# Patient Record
Sex: MALE | Race: OTHER | NOT HISPANIC OR LATINO | Employment: UNEMPLOYED | URBAN - METROPOLITAN AREA
[De-identification: names, ages, dates, MRNs, and addresses within clinical notes are randomized per-mention and may not be internally consistent; named-entity substitution may affect disease eponyms.]

---

## 2018-10-30 ENCOUNTER — OFFICE VISIT (OUTPATIENT)
Dept: FAMILY MEDICINE CLINIC | Facility: CLINIC | Age: 63
End: 2018-10-30
Payer: COMMERCIAL

## 2018-10-30 VITALS
TEMPERATURE: 97.8 F | DIASTOLIC BLOOD PRESSURE: 60 MMHG | HEIGHT: 70 IN | OXYGEN SATURATION: 98 % | HEART RATE: 71 BPM | RESPIRATION RATE: 16 BRPM | BODY MASS INDEX: 26.54 KG/M2 | SYSTOLIC BLOOD PRESSURE: 110 MMHG | WEIGHT: 185.4 LBS

## 2018-10-30 DIAGNOSIS — L80 VITILIGO: Primary | Chronic | ICD-10-CM

## 2018-10-30 DIAGNOSIS — M50.30 DDD (DEGENERATIVE DISC DISEASE), CERVICAL: Chronic | ICD-10-CM

## 2018-10-30 DIAGNOSIS — M19.90 IDIOPATHIC OSTEOARTHRITIS: Chronic | ICD-10-CM

## 2018-10-30 DIAGNOSIS — L20.9 ATOPIC DERMATITIS, UNSPECIFIED TYPE: ICD-10-CM

## 2018-10-30 PROCEDURE — 3008F BODY MASS INDEX DOCD: CPT | Performed by: FAMILY MEDICINE

## 2018-10-30 PROCEDURE — 99214 OFFICE O/P EST MOD 30 MIN: CPT | Performed by: FAMILY MEDICINE

## 2018-10-30 RX ORDER — TRIAMCINOLONE ACETONIDE 1 MG/ML
LOTION TOPICAL 2 TIMES DAILY
Qty: 120 ML | Refills: 2 | Status: SHIPPED | OUTPATIENT
Start: 2018-10-30 | End: 2019-04-05

## 2018-11-02 PROBLEM — M19.90 IDIOPATHIC OSTEOARTHRITIS: Status: ACTIVE | Noted: 2017-01-03

## 2018-11-02 PROBLEM — M19.90 IDIOPATHIC OSTEOARTHRITIS: Chronic | Status: ACTIVE | Noted: 2017-01-03

## 2018-11-02 PROBLEM — L80 VITILIGO: Chronic | Status: ACTIVE | Noted: 2018-11-02

## 2018-11-02 NOTE — PROGRESS NOTES
Assessment/Plan:    1  [place] paperwork completed  2   Referral to Dermatology for vitiligo  3   Triamcinolone lotion for 10 days and then change to Cetaphil cream   4   Follow-up with new PCP in 3-6 months  Problem List Items Addressed This Visit     DDD (degenerative disc disease), cervical (Chronic)    Idiopathic osteoarthritis (Chronic)    Vitiligo - Primary (Chronic)    Relevant Medications    triamcinolone (KENALOG) 0 1 % lotion    Other Relevant Orders    Ambulatory referral to Dermatology      Other Visit Diagnoses     Atopic dermatitis, unspecified type        Relevant Medications    triamcinolone (KENALOG) 0 1 % lotion    Other Relevant Orders    Ambulatory referral to Dermatology            Subjective:      Patient ID: Trenton Carrillo is a 66 y o  male  51-year-old male with past medical history of degenerative disc disease of his cervical spine as well as his lumbar spine, generalized osteoarthritis, and vitiligo presents today for follow-up of his chronic conditions along with recent onset of dry skin that has been itchy on his lower extremities and upper extremities  He otherwise is feeling well  He states he does have intermittent exacerbations of his arthritis and spinal pain which caused him to be unable to perform his job functions and has to call out of work periodically for these chronic conditions  He would like to have his [facility] paperwork renewed today  The following portions of the patient's history were reviewed and updated as appropriate: allergies, current medications, past family history, past medical history, past social history, past surgical history and problem list     Review of Systems   Constitutional: Negative for appetite change and unexpected weight change  Eyes: Negative for visual disturbance  Respiratory: Negative for chest tightness and shortness of breath  Cardiovascular: Negative for chest pain, palpitations and leg swelling     Genitourinary: Negative for frequency  Musculoskeletal: Positive for arthralgias, back pain and neck pain  Skin: Negative for color change  Neurological: Negative for dizziness  Objective:      /60 (BP Location: Left arm, Patient Position: Sitting, Cuff Size: Large)   Pulse 71   Temp 97 8 °F (36 6 °C) (Tympanic)   Resp 16   Ht 5' 10" (1 778 m)   Wt 84 1 kg (185 lb 6 4 oz)   SpO2 98%   BMI 26 60 kg/m²          Physical Exam   Constitutional: He appears well-developed and well-nourished  No distress  HENT:   Head: Normocephalic and atraumatic  Neck: Normal range of motion  Neck supple  Carotid bruit is not present  No edema present  Cardiovascular: Normal rate, regular rhythm and normal heart sounds  Pulmonary/Chest: Effort normal and breath sounds normal  He has no wheezes  He has no rales  Neurological: He is alert  Skin: Skin is dry  Atopic dermatitis  Hypopigmented areas on his hands  Psychiatric: He has a normal mood and affect   His behavior is normal  Judgment and thought content normal

## 2019-03-01 ENCOUNTER — TRANSCRIBE ORDERS (OUTPATIENT)
Dept: ADMINISTRATIVE | Facility: HOSPITAL | Age: 64
End: 2019-03-01

## 2019-03-01 ENCOUNTER — APPOINTMENT (OUTPATIENT)
Dept: RADIOLOGY | Age: 64
End: 2019-03-01
Payer: COMMERCIAL

## 2019-03-01 DIAGNOSIS — F17.220 CHEWING TOBACCO NICOTINE DEPENDENCE, UNCOMPLICATED: ICD-10-CM

## 2019-03-01 DIAGNOSIS — K21.9 GASTROESOPHAGEAL REFLUX DISEASE, ESOPHAGITIS PRESENCE NOT SPECIFIED: ICD-10-CM

## 2019-03-01 DIAGNOSIS — R05.9 COUGH: Primary | ICD-10-CM

## 2019-03-01 DIAGNOSIS — R05.9 COUGH: ICD-10-CM

## 2019-03-01 PROCEDURE — 71046 X-RAY EXAM CHEST 2 VIEWS: CPT

## 2019-04-04 ENCOUNTER — HOSPITAL ENCOUNTER (EMERGENCY)
Facility: HOSPITAL | Age: 64
Discharge: HOME | End: 2019-04-05
Attending: EMERGENCY MEDICINE | Admitting: EMERGENCY MEDICINE
Payer: COMMERCIAL

## 2019-04-04 ENCOUNTER — OFFICE VISIT (OUTPATIENT)
Dept: URGENT CARE | Age: 64
End: 2019-04-04
Payer: COMMERCIAL

## 2019-04-04 VITALS
RESPIRATION RATE: 18 BRPM | SYSTOLIC BLOOD PRESSURE: 123 MMHG | HEIGHT: 69 IN | TEMPERATURE: 98.3 F | DIASTOLIC BLOOD PRESSURE: 91 MMHG | BODY MASS INDEX: 25.62 KG/M2 | OXYGEN SATURATION: 99 % | HEART RATE: 112 BPM | WEIGHT: 173 LBS

## 2019-04-04 DIAGNOSIS — R10.84 GENERALIZED ABDOMINAL PAIN: Primary | ICD-10-CM

## 2019-04-04 DIAGNOSIS — R11.2 NON-INTRACTABLE VOMITING WITH NAUSEA, UNSPECIFIED VOMITING TYPE: ICD-10-CM

## 2019-04-04 DIAGNOSIS — R19.7 DIARRHEA, UNSPECIFIED TYPE: ICD-10-CM

## 2019-04-04 LAB
SL AMB  POCT GLUCOSE, UA: NEGATIVE
SL AMB LEUKOCYTE ESTERASE,UA: ABNORMAL
SL AMB POCT BILIRUBIN,UA: NEGATIVE
SL AMB POCT BLOOD,UA: ABNORMAL
SL AMB POCT CLARITY,UA: CLEAR
SL AMB POCT COLOR,UA: YELLOW
SL AMB POCT KETONES,UA: 80
SL AMB POCT NITRITE,UA: NEGATIVE
SL AMB POCT PH,UA: 5
SL AMB POCT SPECIFIC GRAVITY,UA: 1.02
SL AMB POCT URINE PROTEIN: NEGATIVE
SL AMB POCT UROBILINOGEN: 0.2

## 2019-04-04 PROCEDURE — 87086 URINE CULTURE/COLONY COUNT: CPT | Performed by: PHYSICIAN ASSISTANT

## 2019-04-04 PROCEDURE — 99284 EMERGENCY DEPT VISIT MOD MDM: CPT

## 2019-04-04 PROCEDURE — 99284 EMERGENCY DEPT VISIT MOD MDM: CPT | Performed by: NURSE PRACTITIONER

## 2019-04-04 PROCEDURE — 99213 OFFICE O/P EST LOW 20 MIN: CPT | Performed by: PHYSICIAN ASSISTANT

## 2019-04-04 RX ORDER — ONDANSETRON 2 MG/ML
4 INJECTION INTRAMUSCULAR; INTRAVENOUS ONCE
Status: COMPLETED | OUTPATIENT
Start: 2019-04-05 | End: 2019-04-05

## 2019-04-04 RX ORDER — FEXOFENADINE HCL 180 MG/1
1 TABLET ORAL EVERY 24 HOURS
COMMUNITY
Start: 2017-09-18 | End: 2019-04-05

## 2019-04-04 RX ORDER — MONTELUKAST SODIUM 10 MG/1
TABLET ORAL
Refills: 0 | COMMUNITY
Start: 2019-03-11 | End: 2019-04-05

## 2019-04-04 RX ORDER — HYDROMORPHONE HCL/PF 1 MG/ML
1 SYRINGE (ML) INJECTION ONCE
Status: COMPLETED | OUTPATIENT
Start: 2019-04-05 | End: 2019-04-05

## 2019-04-04 RX ORDER — OMEPRAZOLE 20 MG/1
CAPSULE, DELAYED RELEASE ORAL
Refills: 1 | COMMUNITY
Start: 2019-03-11 | End: 2019-04-05

## 2019-04-05 ENCOUNTER — APPOINTMENT (EMERGENCY)
Dept: CT IMAGING | Facility: HOSPITAL | Age: 64
End: 2019-04-05
Payer: COMMERCIAL

## 2019-04-05 VITALS
TEMPERATURE: 98.2 F | SYSTOLIC BLOOD PRESSURE: 108 MMHG | BODY MASS INDEX: 25.5 KG/M2 | HEART RATE: 97 BPM | RESPIRATION RATE: 16 BRPM | OXYGEN SATURATION: 99 % | DIASTOLIC BLOOD PRESSURE: 66 MMHG | WEIGHT: 170.19 LBS

## 2019-04-05 LAB
ALBUMIN SERPL BCP-MCNC: 3.9 G/DL (ref 3.5–5)
ALP SERPL-CCNC: 53 U/L (ref 46–116)
ALT SERPL W P-5'-P-CCNC: 25 U/L (ref 12–78)
ANION GAP BLD CALC-SCNC: 18 MMOL/L (ref 4–13)
ANION GAP SERPL CALCULATED.3IONS-SCNC: 11 MMOL/L (ref 4–13)
APTT PPP: 29 SECONDS (ref 26–38)
AST SERPL W P-5'-P-CCNC: 19 U/L (ref 5–45)
ATRIAL RATE: 94 BPM
BASOPHILS # BLD AUTO: 0.01 THOUSANDS/ΜL (ref 0–0.1)
BASOPHILS NFR BLD AUTO: 0 % (ref 0–1)
BILIRUB SERPL-MCNC: 0.76 MG/DL (ref 0.2–1)
BUN BLD-MCNC: 16 MG/DL (ref 5–25)
BUN SERPL-MCNC: 16 MG/DL (ref 5–25)
CA-I BLD-SCNC: 1.14 MMOL/L (ref 1.12–1.32)
CALCIUM SERPL-MCNC: 9 MG/DL (ref 8.3–10.1)
CHLORIDE BLD-SCNC: 102 MMOL/L (ref 100–108)
CHLORIDE SERPL-SCNC: 103 MMOL/L (ref 100–108)
CK SERPL-CCNC: 70 U/L (ref 39–308)
CO2 SERPL-SCNC: 26 MMOL/L (ref 21–32)
CREAT BLD-MCNC: 0.8 MG/DL (ref 0.6–1.3)
CREAT SERPL-MCNC: 0.9 MG/DL (ref 0.6–1.3)
EOSINOPHIL # BLD AUTO: 0.01 THOUSAND/ΜL (ref 0–0.61)
EOSINOPHIL NFR BLD AUTO: 0 % (ref 0–6)
ERYTHROCYTE [DISTWIDTH] IN BLOOD BY AUTOMATED COUNT: 12.4 % (ref 11.6–15.1)
GFR SERPL CREATININE-BSD FRML MDRD: 91 ML/MIN/1.73SQ M
GFR SERPL CREATININE-BSD FRML MDRD: 96 ML/MIN/1.73SQ M
GLUCOSE SERPL-MCNC: 123 MG/DL (ref 65–140)
GLUCOSE SERPL-MCNC: 123 MG/DL (ref 65–140)
HCT VFR BLD AUTO: 41.2 % (ref 36.5–49.3)
HCT VFR BLD CALC: 41 % (ref 36.5–49.3)
HGB BLD-MCNC: 13.3 G/DL (ref 12–17)
HGB BLDA-MCNC: 13.9 G/DL (ref 12–17)
IMM GRANULOCYTES # BLD AUTO: 0.02 THOUSAND/UL (ref 0–0.2)
IMM GRANULOCYTES NFR BLD AUTO: 0 % (ref 0–2)
INR PPP: 0.96 (ref 0.86–1.17)
LACTATE SERPL-SCNC: 0.8 MMOL/L (ref 0.5–2)
LIPASE SERPL-CCNC: 105 U/L (ref 73–393)
LYMPHOCYTES # BLD AUTO: 0.3 THOUSANDS/ΜL (ref 0.6–4.47)
LYMPHOCYTES NFR BLD AUTO: 4 % (ref 14–44)
MAGNESIUM SERPL-MCNC: 1.6 MG/DL (ref 1.6–2.6)
MCH RBC QN AUTO: 29.2 PG (ref 26.8–34.3)
MCHC RBC AUTO-ENTMCNC: 32.3 G/DL (ref 31.4–37.4)
MCV RBC AUTO: 91 FL (ref 82–98)
MONOCYTES # BLD AUTO: 0.3 THOUSAND/ΜL (ref 0.17–1.22)
MONOCYTES NFR BLD AUTO: 4 % (ref 4–12)
NEUTROPHILS # BLD AUTO: 7.5 THOUSANDS/ΜL (ref 1.85–7.62)
NEUTS SEG NFR BLD AUTO: 92 % (ref 43–75)
NRBC BLD AUTO-RTO: 0 /100 WBCS
P AXIS: 43 DEGREES
PCO2 BLD: 24 MMOL/L (ref 21–32)
PLATELET # BLD AUTO: 277 THOUSANDS/UL (ref 149–390)
PMV BLD AUTO: 10 FL (ref 8.9–12.7)
POTASSIUM BLD-SCNC: 3.9 MMOL/L (ref 3.5–5.3)
POTASSIUM SERPL-SCNC: 4 MMOL/L (ref 3.5–5.3)
PR INTERVAL: 164 MS
PROT SERPL-MCNC: 7.5 G/DL (ref 6.4–8.2)
PROTHROMBIN TIME: 12.9 SECONDS (ref 11.8–14.2)
QRS AXIS: 6 DEGREES
QRSD INTERVAL: 72 MS
QT INTERVAL: 320 MS
QTC INTERVAL: 400 MS
RBC # BLD AUTO: 4.55 MILLION/UL (ref 3.88–5.62)
SODIUM BLD-SCNC: 140 MMOL/L (ref 136–145)
SODIUM SERPL-SCNC: 140 MMOL/L (ref 136–145)
SPECIMEN SOURCE: ABNORMAL
T WAVE AXIS: 53 DEGREES
TROPONIN I SERPL-MCNC: <0.02 NG/ML
VENTRICULAR RATE: 94 BPM
WBC # BLD AUTO: 8.14 THOUSAND/UL (ref 4.31–10.16)

## 2019-04-05 PROCEDURE — 96375 TX/PRO/DX INJ NEW DRUG ADDON: CPT

## 2019-04-05 PROCEDURE — 85025 COMPLETE CBC W/AUTO DIFF WBC: CPT | Performed by: NURSE PRACTITIONER

## 2019-04-05 PROCEDURE — 96361 HYDRATE IV INFUSION ADD-ON: CPT

## 2019-04-05 PROCEDURE — 83690 ASSAY OF LIPASE: CPT | Performed by: NURSE PRACTITIONER

## 2019-04-05 PROCEDURE — 80047 BASIC METABLC PNL IONIZED CA: CPT

## 2019-04-05 PROCEDURE — 96374 THER/PROPH/DIAG INJ IV PUSH: CPT

## 2019-04-05 PROCEDURE — 93005 ELECTROCARDIOGRAM TRACING: CPT

## 2019-04-05 PROCEDURE — 36415 COLL VENOUS BLD VENIPUNCTURE: CPT | Performed by: NURSE PRACTITIONER

## 2019-04-05 PROCEDURE — 71275 CT ANGIOGRAPHY CHEST: CPT

## 2019-04-05 PROCEDURE — 93010 ELECTROCARDIOGRAM REPORT: CPT | Performed by: INTERNAL MEDICINE

## 2019-04-05 PROCEDURE — 82550 ASSAY OF CK (CPK): CPT | Performed by: NURSE PRACTITIONER

## 2019-04-05 PROCEDURE — 74174 CTA ABD&PLVS W/CONTRAST: CPT

## 2019-04-05 PROCEDURE — 85730 THROMBOPLASTIN TIME PARTIAL: CPT | Performed by: NURSE PRACTITIONER

## 2019-04-05 PROCEDURE — 83735 ASSAY OF MAGNESIUM: CPT | Performed by: NURSE PRACTITIONER

## 2019-04-05 PROCEDURE — 85610 PROTHROMBIN TIME: CPT | Performed by: NURSE PRACTITIONER

## 2019-04-05 PROCEDURE — 83605 ASSAY OF LACTIC ACID: CPT | Performed by: NURSE PRACTITIONER

## 2019-04-05 PROCEDURE — 80053 COMPREHEN METABOLIC PANEL: CPT | Performed by: NURSE PRACTITIONER

## 2019-04-05 PROCEDURE — 85014 HEMATOCRIT: CPT

## 2019-04-05 PROCEDURE — 84484 ASSAY OF TROPONIN QUANT: CPT | Performed by: NURSE PRACTITIONER

## 2019-04-05 RX ORDER — ONDANSETRON 4 MG/1
4 TABLET, FILM COATED ORAL EVERY 8 HOURS PRN
Qty: 12 TABLET | Refills: 0 | Status: SHIPPED | OUTPATIENT
Start: 2019-04-05

## 2019-04-05 RX ADMIN — IOHEXOL 100 ML: 350 INJECTION, SOLUTION INTRAVENOUS at 00:57

## 2019-04-05 RX ADMIN — ONDANSETRON 4 MG: 2 INJECTION INTRAMUSCULAR; INTRAVENOUS at 00:08

## 2019-04-05 RX ADMIN — HYDROMORPHONE HYDROCHLORIDE 1 MG: 1 INJECTION, SOLUTION INTRAMUSCULAR; INTRAVENOUS; SUBCUTANEOUS at 00:08

## 2019-04-05 RX ADMIN — SODIUM CHLORIDE 1000 ML: 0.9 INJECTION, SOLUTION INTRAVENOUS at 00:08

## 2019-04-06 LAB — BACTERIA UR CULT: NORMAL

## 2020-02-03 ENCOUNTER — TRANSCRIBE ORDERS (OUTPATIENT)
Dept: ADMINISTRATIVE | Facility: HOSPITAL | Age: 65
End: 2020-02-03

## 2020-02-03 ENCOUNTER — APPOINTMENT (OUTPATIENT)
Dept: RADIOLOGY | Age: 65
End: 2020-02-03
Payer: COMMERCIAL

## 2020-02-03 DIAGNOSIS — M25.511 RIGHT ANTERIOR SHOULDER PAIN: ICD-10-CM

## 2020-02-03 DIAGNOSIS — M25.511 RIGHT SHOULDER PAIN, UNSPECIFIED CHRONICITY: Primary | ICD-10-CM

## 2020-02-03 PROCEDURE — 72050 X-RAY EXAM NECK SPINE 4/5VWS: CPT

## 2020-02-03 PROCEDURE — 73030 X-RAY EXAM OF SHOULDER: CPT

## 2020-08-05 ENCOUNTER — APPOINTMENT (OUTPATIENT)
Dept: RADIOLOGY | Age: 65
End: 2020-08-05
Payer: COMMERCIAL

## 2020-08-05 ENCOUNTER — TRANSCRIBE ORDERS (OUTPATIENT)
Dept: ADMINISTRATIVE | Facility: HOSPITAL | Age: 65
End: 2020-08-05

## 2020-08-05 DIAGNOSIS — M25.542 PAIN INVOLVING JOINT OF FINGER OF LEFT HAND: ICD-10-CM

## 2020-08-05 DIAGNOSIS — M25.542 PAIN INVOLVING JOINT OF FINGER OF LEFT HAND: Primary | ICD-10-CM

## 2020-08-05 PROCEDURE — 73140 X-RAY EXAM OF FINGER(S): CPT

## 2021-03-25 ENCOUNTER — APPOINTMENT (EMERGENCY)
Dept: RADIOLOGY | Facility: HOSPITAL | Age: 66
End: 2021-03-25
Payer: COMMERCIAL

## 2021-03-25 ENCOUNTER — HOSPITAL ENCOUNTER (OUTPATIENT)
Facility: HOSPITAL | Age: 66
Setting detail: OBSERVATION
Discharge: HOME | End: 2021-03-25
Attending: EMERGENCY MEDICINE | Admitting: INTERNAL MEDICINE
Payer: COMMERCIAL

## 2021-03-25 VITALS
RESPIRATION RATE: 18 BRPM | BODY MASS INDEX: 24.34 KG/M2 | HEIGHT: 70 IN | WEIGHT: 170 LBS | TEMPERATURE: 97.3 F | HEART RATE: 60 BPM | DIASTOLIC BLOOD PRESSURE: 56 MMHG | OXYGEN SATURATION: 100 % | SYSTOLIC BLOOD PRESSURE: 113 MMHG

## 2021-03-25 DIAGNOSIS — R07.9 CHEST PAIN: ICD-10-CM

## 2021-03-25 DIAGNOSIS — I24.9 ACUTE CORONARY SYNDROME (HCC): Primary | ICD-10-CM

## 2021-03-25 LAB
ALBUMIN SERPL BCP-MCNC: 3.6 G/DL (ref 3.5–5)
ALP SERPL-CCNC: 52 U/L (ref 46–116)
ALT SERPL W P-5'-P-CCNC: 24 U/L (ref 12–78)
ANION GAP SERPL CALCULATED.3IONS-SCNC: 3 MMOL/L (ref 4–13)
AST SERPL W P-5'-P-CCNC: 14 U/L (ref 5–45)
ATRIAL RATE: 62 BPM
BASOPHILS # BLD AUTO: 0.02 THOUSANDS/ΜL (ref 0–0.1)
BASOPHILS NFR BLD AUTO: 1 % (ref 0–1)
BILIRUB SERPL-MCNC: 0.32 MG/DL (ref 0.2–1)
BUN SERPL-MCNC: 8 MG/DL (ref 5–25)
CALCIUM SERPL-MCNC: 8.8 MG/DL (ref 8.3–10.1)
CHLORIDE SERPL-SCNC: 110 MMOL/L (ref 100–108)
CO2 SERPL-SCNC: 26 MMOL/L (ref 21–32)
CREAT SERPL-MCNC: 0.86 MG/DL (ref 0.6–1.3)
EOSINOPHIL # BLD AUTO: 0.07 THOUSAND/ΜL (ref 0–0.61)
EOSINOPHIL NFR BLD AUTO: 2 % (ref 0–6)
ERYTHROCYTE [DISTWIDTH] IN BLOOD BY AUTOMATED COUNT: 12.3 % (ref 11.6–15.1)
GFR SERPL CREATININE-BSD FRML MDRD: 92 ML/MIN/1.73SQ M
GLUCOSE SERPL-MCNC: 102 MG/DL (ref 65–140)
HCT VFR BLD AUTO: 39 % (ref 36.5–49.3)
HGB BLD-MCNC: 12.7 G/DL (ref 12–17)
IMM GRANULOCYTES # BLD AUTO: 0 THOUSAND/UL (ref 0–0.2)
IMM GRANULOCYTES NFR BLD AUTO: 0 % (ref 0–2)
LYMPHOCYTES # BLD AUTO: 1.05 THOUSANDS/ΜL (ref 0.6–4.47)
LYMPHOCYTES NFR BLD AUTO: 26 % (ref 14–44)
MCH RBC QN AUTO: 30 PG (ref 26.8–34.3)
MCHC RBC AUTO-ENTMCNC: 32.6 G/DL (ref 31.4–37.4)
MCV RBC AUTO: 92 FL (ref 82–98)
MONOCYTES # BLD AUTO: 0.44 THOUSAND/ΜL (ref 0.17–1.22)
MONOCYTES NFR BLD AUTO: 11 % (ref 4–12)
NEUTROPHILS # BLD AUTO: 2.52 THOUSANDS/ΜL (ref 1.85–7.62)
NEUTS SEG NFR BLD AUTO: 60 % (ref 43–75)
NRBC BLD AUTO-RTO: 0 /100 WBCS
P AXIS: -7 DEGREES
PLATELET # BLD AUTO: 247 THOUSANDS/UL (ref 149–390)
PMV BLD AUTO: 9.7 FL (ref 8.9–12.7)
POTASSIUM SERPL-SCNC: 4.1 MMOL/L (ref 3.5–5.3)
PR INTERVAL: 168 MS
PROT SERPL-MCNC: 7 G/DL (ref 6.4–8.2)
QRS AXIS: -12 DEGREES
QRSD INTERVAL: 68 MS
QT INTERVAL: 408 MS
QTC INTERVAL: 414 MS
RBC # BLD AUTO: 4.24 MILLION/UL (ref 3.88–5.62)
SODIUM SERPL-SCNC: 139 MMOL/L (ref 136–145)
T WAVE AXIS: 50 DEGREES
TROPONIN I SERPL-MCNC: <0.02 NG/ML
VENTRICULAR RATE: 62 BPM
WBC # BLD AUTO: 4.1 THOUSAND/UL (ref 4.31–10.16)

## 2021-03-25 PROCEDURE — 84484 ASSAY OF TROPONIN QUANT: CPT

## 2021-03-25 PROCEDURE — 84484 ASSAY OF TROPONIN QUANT: CPT | Performed by: INTERNAL MEDICINE

## 2021-03-25 PROCEDURE — 36415 COLL VENOUS BLD VENIPUNCTURE: CPT

## 2021-03-25 PROCEDURE — 99285 EMERGENCY DEPT VISIT HI MDM: CPT

## 2021-03-25 PROCEDURE — 99220 PR INITIAL OBSERVATION CARE/DAY 70 MINUTES: CPT | Performed by: INTERNAL MEDICINE

## 2021-03-25 PROCEDURE — 93005 ELECTROCARDIOGRAM TRACING: CPT

## 2021-03-25 PROCEDURE — 99285 EMERGENCY DEPT VISIT HI MDM: CPT | Performed by: EMERGENCY MEDICINE

## 2021-03-25 PROCEDURE — NC001 PR NO CHARGE: Performed by: INTERNAL MEDICINE

## 2021-03-25 PROCEDURE — 93010 ELECTROCARDIOGRAM REPORT: CPT | Performed by: INTERNAL MEDICINE

## 2021-03-25 PROCEDURE — 80053 COMPREHEN METABOLIC PANEL: CPT

## 2021-03-25 PROCEDURE — 85025 COMPLETE CBC W/AUTO DIFF WBC: CPT

## 2021-03-25 PROCEDURE — 71045 X-RAY EXAM CHEST 1 VIEW: CPT

## 2021-03-25 RX ORDER — ACETAMINOPHEN 325 MG/1
650 TABLET ORAL EVERY 6 HOURS PRN
Status: DISCONTINUED | OUTPATIENT
Start: 2021-03-25 | End: 2021-03-25 | Stop reason: HOSPADM

## 2021-03-25 RX ORDER — ASPIRIN 81 MG/1
81 TABLET, CHEWABLE ORAL DAILY
Status: DISCONTINUED | OUTPATIENT
Start: 2021-03-26 | End: 2021-03-25 | Stop reason: HOSPADM

## 2021-03-25 RX ORDER — NITROGLYCERIN 0.4 MG/1
0.4 TABLET SUBLINGUAL
Status: DISCONTINUED | OUTPATIENT
Start: 2021-03-25 | End: 2021-03-25 | Stop reason: HOSPADM

## 2021-03-25 RX ORDER — ONDANSETRON 2 MG/ML
4 INJECTION INTRAMUSCULAR; INTRAVENOUS EVERY 6 HOURS PRN
Status: DISCONTINUED | OUTPATIENT
Start: 2021-03-25 | End: 2021-03-25 | Stop reason: HOSPADM

## 2021-03-25 NOTE — H&P
[facility]  H&P- Rose Mary Gage 1955, 61 y o  male MRN: [id number]  Unit/Bed#: ED 09 Encounter: [id number]  Primary Care Provider: Sean Garcia MD   Date and time admitted to hospital: 3/25/2021  9:43 AM    * Chest pain in adult  Assessment & Plan  - Presented with left sided pressure like chest pain radiating to left arm, diaphoresis and dyspnea relieved by nitroglycerin x 3    - Typical anginal chest pain  Normal EKG, negative troponin x 2   - BONNIE score of 1   - Trend troponin  - Discharge if 3rd troponin is negative  VTE Prophylaxis: Enoxaparin (Lovenox)  / sequential compression device   Code Status: Full Code  POLST: There is no POLST form on file for this patient (pre-hospital)  Discussion with family: With son present at bedside  Anticipated Length of Stay:  Patient will be admitted on an Observation basis with an anticipated length of stay of  2 midnights  Justification for Hospital Stay:    Total Time for Visit, including Counseling / Coordination of Care: 1 hour  Greater than 50% of this total time spent on direct patient counseling and coordination of care  Chief Complaint:   Chest Pain    History of Present Illness:    Rose Mary Gage is a 61 y o  male with no significant past medical history who presents with chest pain  The chest pain started this morning at work, was a pressure like sensation on the left side of his chest with radiation to left arm, was associated with diaphoresis and shortness of breath  He reports being healthy and has never had a similar episode of chest pain  He works in the [employer] and wakes up at 2am to be at work at [employer] he eats breakfast at [employer]  Today was a normal work day for him until around [address] when he had the chest pain  Co workers saw the patient in distress and called EMS    Patient was still in pain when EMS arrived was given Aspirin 324mg and nitroglycerin x 3 on route to ED with resolution of his symptoms  He is not in pain when seen  ED evaluation include normal EKG, negative troponin  He is a life time non-smoker and does not drink alcohol  He denies family history of heart disease  Review of Systems:    Review of Systems   Constitutional: Negative for fatigue, fever and unexpected weight change  HENT: Negative for hearing loss and sore throat  Eyes: Negative  Respiratory: Negative for cough, shortness of breath and wheezing  Cardiovascular: Negative for chest pain and palpitations  Gastrointestinal: Negative for abdominal pain, diarrhea, nausea and vomiting  Endocrine: Negative  Genitourinary: Negative for dysuria and hematuria  Musculoskeletal: Negative for arthralgias and back pain  Skin: Negative  Neurological: Negative for dizziness and syncope  Psychiatric/Behavioral:        Mild personal emotional stress       Past Medical and Surgical History:     History reviewed  No pertinent past medical history  History reviewed  No pertinent surgical history  Meds/Allergies:    Prior to Admission medications    Medication Sig Start Date End Date Taking? Authorizing Provider   ondansetron (ZOFRAN) 4 mg tablet Take 1 tablet (4 mg total) by mouth every 8 (eight) hours as needed for nausea or vomiting 4/5/19   Kenyetta Bangura DO     I have reviewed home medications with patient personally  Allergies: No Known Allergies    Social History:     Marital Status: /Civil Union   Occupation: [employer] employee  Patient Pre-hospital Living Situation: Home  Patient Pre-hospital Level of Mobility: Independent   Patient Pre-hospital Diet Restrictions:  Independent  Substance Use History:   Social History     Substance and Sexual Activity   Alcohol Use No     Social History     Tobacco Use   Smoking Status Passive Smoke Exposure - Never Smoker   Smokeless Tobacco Current User    Types: Chew   Tobacco Comment    passive smoke exposure     Social History     Substance and Sexual Activity   Drug Use No       Family History:    non-contributory    Physical Exam:     Vitals:   Blood Pressure: 119/68 (03/25/21 1130)  Pulse: (!) 54 (03/25/21 1130)  Temperature: (!) 97 3 °F (36 3 °C) (03/25/21 0950)  Temp Source: Tympanic (03/25/21 0950)  Respirations: 21 (03/25/21 1130)  Height: 5' 10" (177 8 cm) (03/25/21 0950)  Weight - Scale: 77 1 kg (170 lb) (03/25/21 0950)  SpO2: 99 % (03/25/21 1130)    Physical Exam  Constitutional:       General: He is not in acute distress  Appearance: Normal appearance  He is not ill-appearing or diaphoretic  HENT:      Head: Normocephalic and atraumatic  Right Ear: External ear normal       Left Ear: External ear normal       Nose: Nose normal       Mouth/Throat:      Mouth: Mucous membranes are moist    Eyes:      Extraocular Movements: Extraocular movements intact  Conjunctiva/sclera: Conjunctivae normal       Pupils: Pupils are equal, round, and reactive to light  Cardiovascular:      Rate and Rhythm: Normal rate and regular rhythm  Pulses: Normal pulses  Heart sounds: Normal heart sounds  No murmur  No gallop  Pulmonary:      Effort: Pulmonary effort is normal  No respiratory distress  Breath sounds: Normal breath sounds  No stridor  No wheezing, rhonchi or rales  Chest:      Chest wall: No tenderness  Abdominal:      General: Bowel sounds are normal  There is no distension  Palpations: Abdomen is soft  Tenderness: There is no abdominal tenderness  There is no guarding or rebound  Musculoskeletal: Normal range of motion  Right lower leg: No edema  Left lower leg: No edema  Skin:     General: Skin is warm and dry  Capillary Refill: Capillary refill takes less than 2 seconds  Neurological:      General: No focal deficit present  Mental Status: He is alert and oriented to person, place, and time  Additional Data:     Lab Results: I have personally reviewed pertinent reports  Results from last 7 days   Lab Units 03/25/21  0951   WBC Thousand/uL 4 10*   HEMOGLOBIN g/dL 12 7   HEMATOCRIT % 39 0   PLATELETS Thousands/uL 247   NEUTROS PCT % 60   LYMPHS PCT % 26   MONOS PCT % 11   EOS PCT % 2     Results from last 7 days   Lab Units 03/25/21  0951   SODIUM mmol/L 139   POTASSIUM mmol/L 4 1   CHLORIDE mmol/L 110*   CO2 mmol/L 26   BUN mg/dL 8   CREATININE mg/dL 0 86   ANION GAP mmol/L 3*   CALCIUM mg/dL 8 8   ALBUMIN g/dL 3 6   TOTAL BILIRUBIN mg/dL 0 32   ALK PHOS U/L 52   ALT U/L 24   AST U/L 14   GLUCOSE RANDOM mg/dL 102                       Imaging: I have personally reviewed pertinent reports  XR chest 1 view portable   ED Interpretation by Elina Barker MD (03/25 1025)   No acute abnormality      Final Result by Luiz Pyle MD (03/25 1142)      No acute cardiopulmonary disease  Workstation performed: [id number]             EKG, Pathology, and Other Studies Reviewed on Admission:   · EKG: Normal EKG reviewed    [facility]     ** Please Note: This note has been constructed using a voice recognition system   **

## 2021-03-25 NOTE — ASSESSMENT & PLAN NOTE
- Presented with left sided pressure like chest pain radiating to left arm, diaphoresis and dyspnea relieved by nitroglycerin x 3    - Typical anginal chest pain  Normal EKG, negative troponin x 2   - BONNIE score of 1   - Trend troponin  - Discharge if 3rd troponin is negative

## 2021-03-25 NOTE — ED NOTES
Patient to have 3rd Trop, if negative will be discharged per Dr Wang from [address] [place] [address] RN and PACs made aware       Wilbur Serrano RN  03/25/21 0962

## 2021-03-25 NOTE — ED PROVIDER NOTES
History  Chief Complaint   Patient presents with    Chest Pain     Pt report cp starting today, pain was radiating down L arm, but denies anymore  pt denies sob, dizziness, lightheadedness     HPI  44-year-old male with possible history of high cholesterol presenting for evaluation of chest pain  Patient states he had sudden onset of central to left-sided chest pressure at 8:40 this morning while at work, pain radiating to the left arm  This was associated with dyspnea and diaphoresis  The patient's boss called 911 and patient was brought to the hospital by EMS  He received 324 mg aspirin pre-hospital   He also received 3 sublingual nitroglycerin which he tells me helped his pain significantly  At this point he has only mild residual chest pressure and no further shortness of breath or diaphoresis  Had some nausea with the initial event as well but this has since resolved  No abdominal pain or back pain  Patient had been otherwise well with no recent illness  He is not a current smoker  No known history of high blood pressure  No known family history of heart disease  Prior to Admission Medications   Prescriptions Last Dose Informant Patient Reported? Taking?   ondansetron (ZOFRAN) 4 mg tablet   No No   Sig: Take 1 tablet (4 mg total) by mouth every 8 (eight) hours as needed for nausea or vomiting      Facility-Administered Medications: None       History reviewed  No pertinent past medical history  History reviewed  No pertinent surgical history  Family History   Problem Relation Age of Onset    Lung cancer Father     Nephrolithiasis Brother      I have reviewed and agree with the history as documented      E-Cigarette/Vaping    E-Cigarette Use Never User      E-Cigarette/Vaping Substances    Nicotine No     THC No     CBD No     Flavoring No     Other No     Unknown No      Social History     Tobacco Use    Smoking status: Passive Smoke Exposure - Never Smoker    Smokeless tobacco: Current User     Types: Chew    Tobacco comment: passive smoke exposure   Substance Use Topics    Alcohol use: No    Drug use: No       Review of Systems   Constitutional: Negative for chills and fever  HENT: Negative  Eyes: Negative  Respiratory: Positive for shortness of breath  Negative for cough  Cardiovascular: Positive for chest pain  Negative for leg swelling  Gastrointestinal: Positive for nausea  Negative for abdominal pain and vomiting  Endocrine: Negative  Genitourinary: Negative  Musculoskeletal: Positive for arthralgias  Skin: Negative  Allergic/Immunologic: Negative  Neurological: Negative for dizziness and headaches  Hematological: Negative  Physical Exam  Physical Exam  Vitals signs and nursing note reviewed  Constitutional:       General: He is not in acute distress  Appearance: Normal appearance  HENT:      Head: Normocephalic and atraumatic  Mouth/Throat:      Mouth: Mucous membranes are moist       Pharynx: Oropharynx is clear  Eyes:      Conjunctiva/sclera: Conjunctivae normal       Pupils: Pupils are equal, round, and reactive to light  Neck:      Musculoskeletal: Neck supple  Cardiovascular:      Rate and Rhythm: Normal rate and regular rhythm  Heart sounds: No murmur  No friction rub  No gallop  Pulmonary:      Effort: Pulmonary effort is normal  No respiratory distress  Breath sounds: Normal breath sounds  Abdominal:      General: There is no distension  Palpations: Abdomen is soft  Tenderness: There is no abdominal tenderness  Musculoskeletal: Normal range of motion  General: No swelling  Right lower leg: No edema  Left lower leg: No edema  Skin:     General: Skin is warm and dry  Neurological:      General: No focal deficit present  Mental Status: He is alert           Vital Signs  ED Triage Vitals   Temperature Pulse Respirations Blood Pressure SpO2   03/25/21 0950 03/25/21 2551 03/25/21 0946 03/25/21 0946 03/25/21 0946   (!) 97 3 °F (36 3 °C) 58 16 119/66 99 %      Temp Source Heart Rate Source Patient Position - Orthostatic VS BP Location FiO2 (%)   03/25/21 0950 03/25/21 0946 03/25/21 0946 03/25/21 0946 --   Tympanic Monitor Sitting Right arm       Pain Score       03/25/21 0946       5           Vitals:    03/25/21 0946   BP: 119/66   Pulse: 58   Patient Position - Orthostatic VS: Sitting         Visual Acuity      ED Medications  Medications - No data to display    Diagnostic Studies  Results Reviewed     Procedure Component Value Units Date/Time    Comprehensive metabolic panel [[id number]]  (Abnormal) Collected: 03/25/21 0951    Lab Status: Final result Specimen: Blood from Arm, Left Updated: 03/25/21 1024     Sodium 139 mmol/L      Potassium 4 1 mmol/L      Chloride 110 mmol/L      CO2 26 mmol/L      ANION GAP 3 mmol/L      BUN 8 mg/dL      Creatinine 0 86 mg/dL      Glucose 102 mg/dL      Calcium 8 8 mg/dL      AST 14 U/L      ALT 24 U/L      Alkaline Phosphatase 52 U/L      Total Protein 7 0 g/dL      Albumin 3 6 g/dL      Total Bilirubin 0 32 mg/dL      eGFR 92 ml/min/1 73sq m     Narrative:      [place] guidelines for Chronic Kidney Disease (CKD):     Stage 1 with normal or high GFR (GFR > 90 mL/min/1 73 square meters)    Stage 2 Mild CKD (GFR = 60-89 mL/min/1 73 square meters)    Stage 3A Moderate CKD (GFR = 45-59 mL/min/1 73 square meters)    Stage 3B Moderate CKD (GFR = 30-44 mL/min/1 73 square meters)    Stage 4 Severe CKD (GFR = 15-29 mL/min/1 73 square meters)    Stage 5 End Stage CKD (GFR <15 mL/min/1 73 square meters)  Note: GFR calculation is accurate only with a steady state creatinine    Troponin I [[id number]]  (Normal) Collected: 03/25/21 0951    Lab Status: Final result Specimen: Blood from Arm, Left Updated: 03/25/21 1024     Troponin I <0 02 ng/mL     CBC and differential [[id number]]  (Abnormal) Collected: 03/25/21 0951    Lab Status: Final result Specimen: Blood from Arm, Left Updated: 03/25/21 0959     WBC 4 10 Thousand/uL      RBC 4 24 Million/uL      Hemoglobin 12 7 g/dL      Hematocrit 39 0 %      MCV 92 fL      MCH 30 0 pg      MCHC 32 6 g/dL      RDW 12 3 %      MPV 9 7 fL      Platelets 409 Thousands/uL      nRBC 0 /100 WBCs      Neutrophils Relative 60 %      Immat GRANS % 0 %      Lymphocytes Relative 26 %      Monocytes Relative 11 %      Eosinophils Relative 2 %      Basophils Relative 1 %      Neutrophils Absolute 2 52 Thousands/µL      Immature Grans Absolute 0 00 Thousand/uL      Lymphocytes Absolute 1 05 Thousands/µL      Monocytes Absolute 0 44 Thousand/µL      Eosinophils Absolute 0 07 Thousand/µL      Basophils Absolute 0 02 Thousands/µL                  XR chest 1 view portable   ED Interpretation by Maddison Marques MD (03/25 1025)   No acute abnormality                 Procedures  ECG 12 Lead Documentation Only    Date/Time: 3/25/2021 10:40 AM  Performed by: Maddison Marques MD  Authorized by: Maddison Marques MD     Indications / Diagnosis:  Chest pain  Interpretation:     Interpretation comment:  Normal sinus rhythm  No ectopy  Normal axis  Normal intervals  No acute ST abnormality  Rate:     ECG rate:  60             ED Course             HEART Risk Score      Most Recent Value   Heart Score Risk Calculator   History  2 Filed at: 03/25/2021 1027   ECG  0 Filed at: 03/25/2021 1027   Age  1 Filed at: 03/25/2021 1027   Risk Factors  1 Filed at: 03/25/2021 1027   Troponin  0 Filed at: 03/25/2021 1027   HEART Score  4 Filed at: 03/25/2021 1027                                    MDM  Number of Diagnoses or Management Options  Acute coronary syndrome [facility]):   Chest pain:   Diagnosis management comments: 81-year-old man presenting with chest pressure associated with dyspnea and diaphoresis, which improved after administration of sublingual nitroglycerin    Initial workup in the emergency department with no significant acute abnormalities  Heart score is 4  I do have concerns for acute coronary syndrome given the patient's story and the improvement with nitroglycerin  As such will admit for ACS obs  Amount and/or Complexity of Data Reviewed  Clinical lab tests: ordered and reviewed  Tests in the radiology section of CPT®: ordered and reviewed        Disposition  Final diagnoses:   Acute coronary syndrome ([facility] [address] )   Chest pain     Time reflects when diagnosis was documented in both MDM as applicable and the Disposition within this note     Time User Action Codes Description Comment    3/25/2021 10:36 AM Ej CRUMP Add [I24 9] Acute coronary syndrome ([facility] [address] )     3/25/2021 10:37 AM Valaria Base Add [R07 9] Chest pain       ED Disposition     ED Disposition Condition Date/Time Comment    Admit Stable Thu Mar 25, 2021 11:05 AM Case was discussed with Dr Jigna Graham and the patient's admission status was agreed to be Admission Status: observation status to the service of Dr Jigna Graham   Follow-up Information    None         Patient's Medications   Discharge Prescriptions    No medications on file     No discharge procedures on file      PDMP Review     None          ED Provider  Electronically Signed by           Jazlyn Olivares MD  03/25/21 5692

## 2021-03-25 NOTE — ASSESSMENT & PLAN NOTE
- Presented with left sided pressure like chest pain radiating to left arm, diaphoresis and dyspnea relieved by nitroglycerin x 3    - Typical anginal chest pain   Normal EKG, negative troponin x 2   - BONNIE score of 3  - Trend troponin  - Admit patient for stress test   - Consult cardiology

## 2021-03-25 NOTE — LETTER
[address]  [address] [place] [address]  Dept: [phone]    March 25, 2021     Patient: Lakshmi Clark   YOB: 1955   Date of Visit: 3/25/2021       To Whom it May Concern:    Lakshmi Clark is under my professional care  He was seen in the hospital from 3/25/2021   to 03/25/21  He may return to work on March 29, 2021 without limitations  If you have any questions or concerns, please don't hesitate to call           Sincerely,          Albertina Rob MD

## 2021-03-25 NOTE — DISCHARGE SUMMARY
Discharge Summary - [facility] Medicine    Patient Information: Marcel Rodrigez 61 y o  male MRN: [id number]  Unit/Bed#: ED 09 Encounter: [id number]    Discharging Physician / Practitioner: Konrad Sampson MD  PCP: Hedy Bermeo MD  Admission Date: 3/25/2021  Discharge Date: 03/25/21    Reason for Admission: Chest Pain    Discharge Diagnoses:     Principal Problem (Resolved):    Chest pain in adult  Active Problems:    Gastroesophageal reflux disease    Idiopathic osteoarthritis    Vitiligo      Consultations During Hospital Stay:  · None    Procedures Performed:   · EKG  · CXR  · Labs    Significant Findings:   · EKG - Normal sinus rhythm without ST changes  · CXR - No acute cardiopulmonary disease  · Labs Negative troponin x 3  Incidental Findings:   · None    Test Results Pending at Discharge (will require follow up): · None     Outpatient Tests Requested:  · Cardiac Stress test    Complications:  None     Hospital Course:   HPI  Marcel Rodrigez is a 61 y o  male patient who originally presented to the hospital on 3/25/2021 due to chest pain  The chest pain started in the morning at work  It was a pressure like sensation on the left side of his chest with radiation to left arm, was associated with diaphoresis and shortness of breath  He reports being healthy and has never had a similar episode of chest pain  He works in the [employer] and wakes up at 2am to be at work at [employer] he eats breakfast at [employer]  Today was a normal work day for him until around [address] when he had the chest pain  Co workers saw the patient in distress and called EMS  Patient was still in pain when EMS arrived was given Aspirin 324mg and nitroglycerin x 3 on route to ED with resolution of his symptoms  He is not in pain when seen in ED  ED Course  In no acute distress  Evaluation included EKG showing normal sinus rhythm without ST changes, CXR showing no acute cardiopulmonary disease, negative troponin x 3   He is a life time non-smoker and does not drink alcohol  He is not Diabetic  He denies family history of heart disease  BONNIE score of 1  Patient discharged home with son to follow up with outpatient stress testing  Condition at Discharge: stable     Discharge Day Visit / Exam:     Subjective:  10 system ROS negative except for HPI  Vitals: Blood Pressure: 111/67 (03/25/21 1430)  Pulse: 59 (03/25/21 1430)  Temperature: (!) 97 3 °F (36 3 °C) (03/25/21 0950)  Temp Source: Tympanic (03/25/21 0950)  Respirations: 18 (03/25/21 1430)  Height: 5' 10" (177 8 cm) (03/25/21 0950)  Weight - Scale: 77 1 kg (170 lb) (03/25/21 0950)  SpO2: 100 % (03/25/21 1430)  Exam:   Physical Exam  Vitals signs and nursing note reviewed  Constitutional:       Appearance: Normal appearance  He is normal weight  HENT:      Head: Normocephalic and atraumatic  Right Ear: External ear normal       Left Ear: External ear normal       Nose: Nose normal       Mouth/Throat:      Mouth: Mucous membranes are moist       Pharynx: Oropharynx is clear  Eyes:      Conjunctiva/sclera: Conjunctivae normal       Pupils: Pupils are equal, round, and reactive to light  Neck:      Musculoskeletal: Neck supple  No muscular tenderness  Cardiovascular:      Rate and Rhythm: Normal rate and regular rhythm  Pulses: Normal pulses  Heart sounds: Normal heart sounds  Pulmonary:      Effort: Pulmonary effort is normal       Breath sounds: Normal breath sounds  Abdominal:      General: Abdomen is flat  Bowel sounds are normal       Palpations: Abdomen is soft  Musculoskeletal:         General: No swelling or tenderness  Skin:     General: Skin is warm and dry  Capillary Refill: Capillary refill takes less than 2 seconds  Neurological:      General: No focal deficit present  Mental Status: He is alert and oriented to person, place, and time  Mental status is at baseline     Psychiatric:         Mood and Affect: Mood normal  Behavior: Behavior normal          Thought Content: Thought content normal          Judgment: Judgment normal          Discharge instructions/Information to patient and family:   See after visit summary for information provided to patient and family  Provisions for Follow-Up Care:  See after visit summary for information related to follow-up care and any pertinent home health orders  Disposition:     Home    For Discharges to [facility] SNF:   · Not Applicable to this Patient - Not Applicable to this Patient    Planned Readmission: No     Discharge Statement:  I spent 36 minutes discharging the patient  This time was spent on the day of discharge  I had direct contact with the patient on the day of discharge  Greater than 50% of the total time was spent examining patient, answering all patient questions, arranging and discussing plan of care with patient as well as directly providing post-discharge instructions  Additional time then spent on discharge activities  Discharge Medications:  See after visit summary for reconciled discharge medications provided to patient and family  ** Please Note: Dragon 360 Dictation voice to text software may have been used in the creation of this document   **

## 2021-04-02 ENCOUNTER — HOSPITAL ENCOUNTER (OUTPATIENT)
Dept: NON INVASIVE DIAGNOSTICS | Facility: HOSPITAL | Age: 66
Discharge: HOME | End: 2021-04-02
Attending: INTERNAL MEDICINE
Payer: COMMERCIAL

## 2021-04-02 ENCOUNTER — HOSPITAL ENCOUNTER (OUTPATIENT)
Dept: RADIOLOGY | Facility: HOSPITAL | Age: 66
Discharge: HOME | End: 2021-04-02
Attending: INTERNAL MEDICINE
Payer: COMMERCIAL

## 2021-04-02 DIAGNOSIS — I24.9 ACUTE CORONARY SYNDROME (HCC): ICD-10-CM

## 2021-04-02 DIAGNOSIS — R07.9 CHEST PAIN: ICD-10-CM

## 2021-04-02 PROCEDURE — 93016 CV STRESS TEST SUPVJ ONLY: CPT | Performed by: INTERNAL MEDICINE

## 2021-04-02 PROCEDURE — 93017 CV STRESS TEST TRACING ONLY: CPT

## 2021-04-02 PROCEDURE — 78452 HT MUSCLE IMAGE SPECT MULT: CPT

## 2021-04-02 PROCEDURE — 93018 CV STRESS TEST I&R ONLY: CPT | Performed by: INTERNAL MEDICINE

## 2021-04-02 PROCEDURE — A9502 TC99M TETROFOSMIN: HCPCS

## 2021-04-06 LAB
CHEST PAIN STATEMENT: NORMAL
MAX DIASTOLIC BP: 50 MMHG
MAX HEART RATE: 146 BPM
MAX PREDICTED HEART RATE: 156 BPM
MAX. SYSTOLIC BP: 178 MMHG
PROTOCOL NAME: NORMAL
TARGET HR FORMULA: NORMAL
TEST INDICATION: NORMAL
TIME IN EXERCISE PHASE: NORMAL

## 2021-09-14 ENCOUNTER — APPOINTMENT (OUTPATIENT)
Dept: RADIOLOGY | Age: 66
End: 2021-09-14
Payer: COMMERCIAL

## 2021-09-14 DIAGNOSIS — M79.642 LEFT HAND PAIN: ICD-10-CM

## 2021-09-14 PROCEDURE — 73130 X-RAY EXAM OF HAND: CPT

## 2023-01-20 ENCOUNTER — OFFICE VISIT (OUTPATIENT)
Dept: URGENT CARE | Age: 68
End: 2023-01-20

## 2023-01-20 VITALS — OXYGEN SATURATION: 99 % | HEART RATE: 90 BPM | TEMPERATURE: 98 F | RESPIRATION RATE: 18 BRPM

## 2023-01-20 DIAGNOSIS — M54.50 ACUTE BILATERAL LOW BACK PAIN WITHOUT SCIATICA: Primary | ICD-10-CM

## 2023-01-20 RX ORDER — METHOCARBAMOL 500 MG/1
500 TABLET, FILM COATED ORAL 3 TIMES DAILY PRN
Qty: 15 TABLET | Refills: 0 | Status: SHIPPED | OUTPATIENT
Start: 2023-01-20 | End: 2023-01-25

## 2023-01-20 NOTE — PROGRESS NOTES
[facility]        NAME: Maddi Colvin is a 79 y o  male  : 1955    MRN: [id number]  DATE: 2023  TIME: 3:28 PM    Assessment and Plan   Acute bilateral low back pain without sciatica [M54 50]  1  Acute bilateral low back pain without sciatica  methocarbamol (ROBAXIN) 500 mg tablet        Patient presents with c/o bilateral lower back pain that started last night after he bent over  Denies radiation, numbness, tingling or incontinence  Took one aleve and bought a back brace  Assessment notes ttp to bilateral lower back  Full ROM  No swelling, bruising or deformity  Discussed symptom management and PCP f/u    Patient Instructions       Follow up with PCP as needed    Chief Complaint     Chief Complaint   Patient presents with   • Back Pain         History of Present Illness       Patient presents with c/o bilateral lower back pain that started last night after he bent over  Denies radiation, numbness, tingling or incontinence  Took one aleve and bought a back brace  Assessment notes ttp to bilateral lower back  Full ROM  No swelling, bruising or deformity  Discussed symptom management and PCP f/u      Review of Systems   Review of Systems   Constitutional: Negative for chills, fatigue and fever  HENT: Negative for postnasal drip and sore throat  Respiratory: Negative for cough and shortness of breath  Cardiovascular: Negative for chest pain and palpitations  Gastrointestinal: Negative for abdominal pain, nausea and vomiting  Genitourinary: Negative for dysuria  Musculoskeletal: Positive for back pain  Negative for gait problem and joint swelling  Skin: Negative for rash  Neurological: Negative for dizziness, syncope, light-headedness, numbness and headaches  Psychiatric/Behavioral: Negative for agitation and confusion  All other systems reviewed and are negative          Current Medications       Current Outpatient Medications:   •  methocarbamol (ROBAXIN) 500 mg tablet, Take 1 tablet (500 mg total) by mouth 3 (three) times a day as needed for muscle spasms for up to 5 days, Disp: 15 tablet, Rfl: 0  •  ondansetron (ZOFRAN) 4 mg tablet, Take 1 tablet (4 mg total) by mouth every 8 (eight) hours as needed for nausea or vomiting, Disp: 12 tablet, Rfl: 0    Current Allergies     Allergies as of 01/20/2023   • (No Known Allergies)            The following portions of the patient's history were reviewed and updated as appropriate: allergies, current medications, past family history, past medical history, past social history, past surgical history and problem list      History reviewed  No pertinent past medical history  History reviewed  No pertinent surgical history  Family History   Problem Relation Age of Onset   • Lung cancer Father    • Nephrolithiasis Brother    • No Known Problems Mother          Medications have been verified  Objective   Pulse 90   Temp 98 °F (36 7 °C)   Resp 18   SpO2 99%   No LMP for male patient  Physical Exam     Physical Exam  Vitals reviewed  Constitutional:       General: He is not in acute distress  Appearance: Normal appearance  He is not ill-appearing  HENT:      Head: Normocephalic and atraumatic  Eyes:      Extraocular Movements: Extraocular movements intact  Conjunctiva/sclera: Conjunctivae normal    Pulmonary:      Effort: Pulmonary effort is normal    Musculoskeletal:         General: Tenderness present  No swelling or signs of injury  Skin:     General: Skin is warm  Neurological:      General: No focal deficit present  Mental Status: He is alert     Psychiatric:         Mood and Affect: Mood normal          Behavior: Behavior normal          Judgment: Judgment normal

## 2024-01-04 ENCOUNTER — APPOINTMENT (OUTPATIENT)
Dept: RADIOLOGY | Age: 69
End: 2024-01-04
Payer: COMMERCIAL

## 2024-01-04 DIAGNOSIS — L03.012 CELLULITIS OF FINGER, LEFT: ICD-10-CM

## 2024-01-04 PROCEDURE — 73140 X-RAY EXAM OF FINGER(S): CPT

## 2024-02-08 ENCOUNTER — APPOINTMENT (OUTPATIENT)
Dept: LAB | Age: 69
End: 2024-02-08
Payer: COMMERCIAL

## 2024-02-08 ENCOUNTER — OFFICE VISIT (OUTPATIENT)
Dept: FAMILY MEDICINE CLINIC | Facility: CLINIC | Age: 69
End: 2024-02-08
Payer: COMMERCIAL

## 2024-02-08 VITALS
HEART RATE: 75 BPM | RESPIRATION RATE: 16 BRPM | BODY MASS INDEX: 27.22 KG/M2 | SYSTOLIC BLOOD PRESSURE: 132 MMHG | DIASTOLIC BLOOD PRESSURE: 70 MMHG | OXYGEN SATURATION: 98 % | HEIGHT: 68 IN | TEMPERATURE: 98 F | WEIGHT: 179.6 LBS

## 2024-02-08 DIAGNOSIS — M25.59 PAIN IN OTHER JOINT: Primary | ICD-10-CM

## 2024-02-08 DIAGNOSIS — M25.59 PAIN IN OTHER JOINT: ICD-10-CM

## 2024-02-08 DIAGNOSIS — E78.5 HYPERLIPIDEMIA, UNSPECIFIED HYPERLIPIDEMIA TYPE: ICD-10-CM

## 2024-02-08 DIAGNOSIS — K42.9 UMBILICAL HERNIA WITHOUT OBSTRUCTION AND WITHOUT GANGRENE: ICD-10-CM

## 2024-02-08 DIAGNOSIS — L30.9 DERMATITIS: ICD-10-CM

## 2024-02-08 DIAGNOSIS — Z11.59 NEED FOR HEPATITIS C SCREENING TEST: ICD-10-CM

## 2024-02-08 DIAGNOSIS — Z76.89 ENCOUNTER TO ESTABLISH CARE WITH NEW DOCTOR: Primary | ICD-10-CM

## 2024-02-08 LAB
ANA SER QL IA: NEGATIVE
CRP SERPL QL: 6.4 MG/L
HCV AB SER QL: NORMAL

## 2024-02-08 PROCEDURE — 86038 ANTINUCLEAR ANTIBODIES: CPT

## 2024-02-08 PROCEDURE — 86037 ANCA TITER EACH ANTIBODY: CPT

## 2024-02-08 PROCEDURE — 99204 OFFICE O/P NEW MOD 45 MIN: CPT | Performed by: INTERNAL MEDICINE

## 2024-02-08 PROCEDURE — 86200 CCP ANTIBODY: CPT

## 2024-02-08 PROCEDURE — 86235 NUCLEAR ANTIGEN ANTIBODY: CPT

## 2024-02-08 PROCEDURE — 82180 ASSAY OF ASCORBIC ACID: CPT

## 2024-02-08 PROCEDURE — 86803 HEPATITIS C AB TEST: CPT

## 2024-02-08 PROCEDURE — 86140 C-REACTIVE PROTEIN: CPT

## 2024-02-08 PROCEDURE — 36415 COLL VENOUS BLD VENIPUNCTURE: CPT

## 2024-02-08 PROCEDURE — 83520 IMMUNOASSAY QUANT NOS NONAB: CPT

## 2024-02-08 PROCEDURE — 86430 RHEUMATOID FACTOR TEST QUAL: CPT

## 2024-02-08 RX ORDER — CEPHALEXIN 500 MG/1
500 CAPSULE ORAL EVERY 8 HOURS
COMMUNITY
Start: 2024-01-04

## 2024-02-08 RX ORDER — ROSUVASTATIN CALCIUM 20 MG/1
20 TABLET, COATED ORAL DAILY
COMMUNITY
Start: 2024-01-04

## 2024-02-08 RX ORDER — AZELASTINE HYDROCHLORIDE 137 UG/1
SPRAY, METERED NASAL
COMMUNITY
Start: 2024-01-03

## 2024-02-08 RX ORDER — CLOBETASOL PROPIONATE 0.5 MG/ML
1 LOTION TOPICAL 2 TIMES DAILY
Qty: 59 ML | Refills: 0 | Status: SHIPPED | OUTPATIENT
Start: 2024-02-08 | End: 2024-02-15

## 2024-02-08 RX ORDER — FOLIC ACID 1 MG/1
1 TABLET ORAL DAILY
COMMUNITY
Start: 2023-09-25 | End: 2024-09-24

## 2024-02-08 NOTE — PROGRESS NOTES
Assessment/Plan:           Problem List Items Addressed This Visit    None  Visit Diagnoses       Encounter to establish care with new doctor    -  Primary    Pain in other joint        Relevant Orders    TATYANA Screen w/ Reflex to Titer/Pattern    C-reactive protein    Cyclic citrul peptide antibody, IgG    PM/SCL ANTIBODIES    RF Screen w/ Reflex to Titer    Sjogren's Antibodies    ANCA Screen With MPO and PR3 With Reflex To ANCA Titer    Dermatitis        Relevant Medications    clobetasol propionate (CLOBEX) 0.05 % lotion    Other Relevant Orders    ANCA Screen With MPO and PR3 With Reflex To ANCA Titer    Vitamin C    Hyperlipidemia, unspecified hyperlipidemia type        Relevant Medications    rosuvastatin (CRESTOR) 20 MG tablet    Umbilical hernia without obstruction and without gangrene        Need for hepatitis C screening test        Relevant Orders    Hepatitis C Antibody              Subjective:      Patient ID: Papito Gibson is a 69 y.o. male.    HPI  Patient is here to establish care.  Today's visit is prompted by a consult for rash on his lower extremity going on for several years now.  He does mention arthralgia in recurrent.  He can like complaint on his left middle finger.  He reports worsening deformities of his fingers.  On asking he does mention morning stiffness does not last for more than 30 minutes.  He does mention his mother had arthritis.  Recent labs as we discussed.  LDL is noted to be elevated.  Patient has been prescribed Crestor which she has not started but was strongly encouraged to do so.  His blood sugar level has increased to 121 from 102 the time before.  Hemoglobin A1c 6.6.  We will repeat blood work in about a month but advised the patient to monitor his carbohydrate intake closely.  TSH is normal.  He is known to have B12 and vitamin D deficiency.  The former likely because of vegan diet.  He had extensive EGD and colonoscopy which was unremarkable.  Repeat B12 next  "month.  He may start on oral supplementation at that time.  Also, vitamin D was recommended.      The following portions of the patient's history were reviewed and updated as appropriate: allergies, current medications, past family history, past medical history, past social history, past surgical history, and problem list.    Review of Systems      Objective:      /70 (BP Location: Left arm, Patient Position: Sitting, Cuff Size: Standard)   Pulse 75   Temp 98 °F (36.7 °C) (Tympanic)   Resp 16   Ht 5' 8.1\" (1.73 m)   Wt 81.5 kg (179 lb 9.6 oz)   SpO2 98%   BMI 27.23 kg/m²          Physical Exam  Constitutional:       Comments: Sounds congested.     Neck:      Vascular: No carotid bruit.   Cardiovascular:      Rate and Rhythm: Normal rate and regular rhythm.      Heart sounds: Normal heart sounds. No murmur heard.  Pulmonary:      Effort: Pulmonary effort is normal. No respiratory distress.      Breath sounds: Normal breath sounds. No wheezing or rales.   Abdominal:      General: Bowel sounds are normal. There is no distension.      Tenderness: There is no abdominal tenderness. There is no guarding.   Musculoskeletal:         General: Deformity (Finger deformities.  Patient pointing out that one of his finger got deformed and is normal now.) present.      Right lower leg: No edema.      Left lower leg: No edema.      Comments: Localized discoloration on his middle IP index finger.     Skin:     Findings: Erythema (Large rash purple in color right foot and a smaller similar currently patch first interdigital space.) present.   Neurological:      Mental Status: He is alert.   Psychiatric:         Mood and Affect: Mood normal.         Behavior: Behavior normal.             Depression Screening and Follow-up Plan: Patient was screened for depression during today's encounter. They screened negative with a PHQ-2 score of 0.     "

## 2024-02-09 LAB
ENA SS-A AB SER-ACNC: <0.2 AI (ref 0–0.9)
ENA SS-B AB SER-ACNC: <0.2 AI (ref 0–0.9)
RHEUMATOID FACT SER QL LA: NEGATIVE

## 2024-02-12 LAB
C-ANCA TITR SER IF: NORMAL TITER
MYELOPEROXIDASE AB SER IA-ACNC: <0.2 UNITS (ref 0–0.9)
P-ANCA ATYPICAL TITR SER IF: NORMAL TITER
P-ANCA TITR SER IF: NORMAL TITER
PROTEINASE3 AB SER IA-ACNC: <0.2 UNITS (ref 0–0.9)

## 2024-02-13 LAB — CCP AB SER IA-ACNC: 1.3

## 2024-02-22 LAB — ENA PM/SCL AB SER-ACNC: 28 UNITS

## 2024-02-26 ENCOUNTER — TELEPHONE (OUTPATIENT)
Dept: ADMINISTRATIVE | Facility: OTHER | Age: 69
End: 2024-02-26

## 2024-02-26 ENCOUNTER — OFFICE VISIT (OUTPATIENT)
Dept: FAMILY MEDICINE CLINIC | Facility: CLINIC | Age: 69
End: 2024-02-26
Payer: COMMERCIAL

## 2024-02-26 VITALS
OXYGEN SATURATION: 100 % | HEART RATE: 88 BPM | SYSTOLIC BLOOD PRESSURE: 140 MMHG | DIASTOLIC BLOOD PRESSURE: 60 MMHG | RESPIRATION RATE: 21 BRPM | TEMPERATURE: 97.7 F | WEIGHT: 179.8 LBS | BODY MASS INDEX: 27.25 KG/M2 | HEIGHT: 68 IN

## 2024-02-26 DIAGNOSIS — L30.9 DERMATITIS: ICD-10-CM

## 2024-02-26 DIAGNOSIS — K42.9 UMBILICAL HERNIA WITHOUT OBSTRUCTION AND WITHOUT GANGRENE: ICD-10-CM

## 2024-02-26 DIAGNOSIS — Z12.5 PROSTATE CANCER SCREENING: ICD-10-CM

## 2024-02-26 DIAGNOSIS — E78.5 HYPERLIPIDEMIA, UNSPECIFIED HYPERLIPIDEMIA TYPE: ICD-10-CM

## 2024-02-26 DIAGNOSIS — Z13.29 THYROID DISORDER SCREENING: ICD-10-CM

## 2024-02-26 DIAGNOSIS — Z00.00 ANNUAL PHYSICAL EXAM: Primary | ICD-10-CM

## 2024-02-26 DIAGNOSIS — M25.59 PAIN IN OTHER JOINT: ICD-10-CM

## 2024-02-26 DIAGNOSIS — M33.90 POLYMYOSITIS-DERMATOMYOSITIS (HCC): ICD-10-CM

## 2024-02-26 DIAGNOSIS — E53.8 B12 DEFICIENCY: ICD-10-CM

## 2024-02-26 PROCEDURE — 99396 PREV VISIT EST AGE 40-64: CPT | Performed by: INTERNAL MEDICINE

## 2024-02-26 PROCEDURE — 99397 PER PM REEVAL EST PAT 65+ YR: CPT | Performed by: INTERNAL MEDICINE

## 2024-02-26 RX ORDER — ROSUVASTATIN CALCIUM 20 MG/1
20 TABLET, COATED ORAL DAILY
Qty: 30 TABLET | Refills: 3 | Status: SHIPPED | OUTPATIENT
Start: 2024-02-26

## 2024-02-26 RX ORDER — ROSUVASTATIN CALCIUM 20 MG/1
20 TABLET, COATED ORAL DAILY
Qty: 30 TABLET | Refills: 3 | Status: SHIPPED | OUTPATIENT
Start: 2024-02-26 | End: 2024-02-26 | Stop reason: SDUPTHER

## 2024-02-26 NOTE — TELEPHONE ENCOUNTER
----- Message from Katherine Mena sent at 2/26/2024 11:07 AM EST -----  Regarding: care gap request  02/26/24 11:07 AM    Hello, our patient attached above has had CRC: Colonoscopy completed/performed. Please assist in updating the patient chart by making an External outreach to [facility] facility located in [place].The date of service is unknown.  Please obtain the most recent colonoscopy for Dr Ritter.  Thank you.    Thank you,  Katherine Mena  [facility]

## 2024-02-26 NOTE — PROGRESS NOTES
ADULT ANNUAL PHYSICAL  [facility]    NAME: Papito Gibson  AGE: 69 y.o. SEX: male  : 1955     DATE: 2024     Assessment and Plan:     Problem List Items Addressed This Visit    None  Visit Diagnoses       Annual physical exam    -  Primary    Hyperlipidemia, unspecified hyperlipidemia type        Relevant Medications    rosuvastatin (CRESTOR) 20 MG tablet    Other Relevant Orders    Lipid panel    Comprehensive metabolic panel    Polymyositis-dermatomyositis (HCC)        Relevant Orders    Ambulatory Referral to Rheumatology    Umbilical hernia without obstruction and without gangrene        Dermatitis        Pain in other joint        Prostate cancer screening        Relevant Orders    PSA, Total Screen    B12 deficiency        Relevant Orders    Vitamin B12    Thyroid disorder screening        Relevant Orders    TSH, 3rd generation with Free T4 reflex              Immunizations and preventive care screenings were discussed with patient today. Appropriate education was printed on patient's after visit summary.        Counseling:  Patient is here to follow-up on recent lab studies.  Patient with last note for detail.  Blood pressure slightly elevated.  Last time blood pressure was good.  Will continue monitoring for now.  Patient has high cholesterol and was prescribed Crestor which she has not been taking.  Refills by his last PCP.  Crestor will be renewed and follow-up blood work in 6 to 8 weeks and I will see him back thereafter.  He does have apical hernia.  We discussed that natural history of hernias and need for general surgery evaluation but patient wants to hold off.  I would review this again at next visit.  Note patient has been having recurrent episodes of dermatitis and arthralgia.  I did order blood work which showed elevated CRP as well as polymyositis/scleroderma antibodies positive.  Patient does report  burning itching sensation.  Rheumatology consultation discussed and patient agrees.    Patient had gone for colonoscopy with  [facility] Dr. Nunn last year.  Will try to get a copy.         No follow-ups on file.     Chief Complaint:     Chief Complaint   Patient presents with    Follow-up     1 mo f/u hernia, hyperlipids. Review BW results.   Skin discoloration on feet, hand - slight itchiness.   Left index finger swelling at first knuckle.   No other questions concerns or problems (CS)      History of Present Illness:     Adult Annual Physical   Patient here for a comprehensive physical exam. The patient reports problems - see below .    Diet and Physical Activity  Diet/Nutrition:  Vegan .   Exercise: walking.      Depression Screening  PHQ-2/9 Depression Screening           General Health  Sleep: gets 7-8 hours of sleep on average.   Hearing: normal - bilateral.  Vision: no vision problems.   Dental: brushes teeth twice daily.        Health  Symptoms include: none    Advanced Care Planning  Do you have an advanced directive? no  Do you have a durable medical power of ? yes  ACP document given to patient? no     Review of Systems:     Review of Systems   Past Medical History:     History reviewed. No pertinent past medical history.   Past Surgical History:     History reviewed. No pertinent surgical history.   Family History:     Family History   Problem Relation Age of Onset    Lung cancer Father     Nephrolithiasis Brother     No Known Problems Mother       Social History:     Social History     Socioeconomic History    Marital status: /Civil Union     Spouse name: None    Number of children: None    Years of education: None    Highest education level: None   Occupational History    None   Tobacco Use    Smoking status: Passive Smoke Exposure - Never Smoker    Smokeless tobacco: Current     Types: Chew    Tobacco comments:     passive smoke exposure   Vaping Use    Vaping status: Never  "Used   Substance and Sexual Activity    Alcohol use: No    Drug use: No    Sexual activity: None   Other Topics Concern    None   Social History Narrative    None     Social Determinants of Health     Financial Resource Strain: Not on file   Food Insecurity: Not on file   Transportation Needs: Not on file   Physical Activity: Not on file   Stress: Not on file   Social Connections: Not on file   Intimate Partner Violence: Not on file   Housing Stability: Not on file      Current Medications:     Current Outpatient Medications   Medication Sig Dispense Refill    rosuvastatin (CRESTOR) 20 MG tablet Take 1 tablet (20 mg total) by mouth daily 30 tablet 3    Azelastine HCl 137 MCG/SPRAY SOLN WAIT 2 WEEKS AND THEN START USING NASAL SPRAY 2 TIMES PER DAY IN EACH NOSTRIL (Patient not taking: Reported on 2/8/2024)      cephalexin (KEFLEX) 500 mg capsule Take 500 mg by mouth every 8 (eight) hours (Patient not taking: Reported on 2/8/2024)      clobetasol propionate (CLOBEX) 0.05 % lotion Apply 1 Application topically 2 (two) times a day for 7 days (Patient not taking: Reported on 2/26/2024) 59 mL 0    folic acid (FOLVITE) 1 mg tablet Take 1 mg by mouth daily (Patient not taking: Reported on 2/8/2024)      methocarbamol (ROBAXIN) 500 mg tablet Take 1 tablet (500 mg total) by mouth 3 (three) times a day as needed for muscle spasms for up to 5 days (Patient not taking: Reported on 2/26/2024) 15 tablet 0    ondansetron (ZOFRAN) 4 mg tablet Take 1 tablet (4 mg total) by mouth every 8 (eight) hours as needed for nausea or vomiting (Patient not taking: Reported on 2/8/2024) 12 tablet 0     No current facility-administered medications for this visit.      Allergies:     No Known Allergies   Physical Exam:     /60 (BP Location: Left arm, Patient Position: Sitting, Cuff Size: Standard)   Pulse 88   Temp 97.7 °F (36.5 °C) (Tympanic)   Resp 21   Ht 5' 8.1\" (1.73 m)   Wt 81.6 kg (179 lb 12.8 oz)   SpO2 100%   BMI 27.26 kg/m² "     Physical Exam  Neck:      Vascular: No carotid bruit.   Cardiovascular:      Rate and Rhythm: Normal rate and regular rhythm.      Heart sounds: Normal heart sounds. No murmur heard.  Pulmonary:      Effort: Pulmonary effort is normal. No respiratory distress.      Breath sounds: Normal breath sounds. No wheezing or rales.   Abdominal:      General: Bowel sounds are normal. There is no distension.      Tenderness: There is no abdominal tenderness. There is no guarding.      Hernia: A hernia (Umbilical hernia reducible) is present.   Musculoskeletal:      Right lower leg: No edema.      Left lower leg: No edema.   Lymphadenopathy:      Cervical: No cervical adenopathy.   Neurological:      Mental Status: He is alert.   Psychiatric:         Mood and Affect: Mood normal.         Behavior: Behavior normal.          [facility]

## 2024-02-26 NOTE — LETTER
Procedure Request Form: Colonoscopy      Date Requested: 24  Patient: Papito Gibson  Patient : 1955   Referring Provider: Lola Ritter MD        Date of Procedure ______________________________       The above patient has informed us that they have completed their   most recent Colonoscopy at your facility. Please complete   this form and attach all corresponding procedure reports/results.    Comments __________________________________________________________  ____________________________________________________________________  ____________________________________________________________________  ____________________________________________________________________    Facility Completing Procedure _________________________________________    Form Completed By (print name) _______________________________________      Signature __________________________________________________________      These reports are needed for  compliance.    Please fax this completed form and a copy of the procedure report to our office located at [address] [place] [address] as soon as possible to Fax [phone] edna Valdez: Phone [phone]    We thank you for your assistance in treating our mutual patient.

## 2024-02-26 NOTE — LETTER
Procedure Request Form: Colonoscopy  Second Request    Date Requested: 24  Patient: Papito Gibson  Patient : 1955   Referring Provider: Lola Ritter MD        Date of Procedure ______________________________       The above patient has informed us that they have completed their   most recent Colonoscopy at your facility. Please complete   this form and attach all corresponding procedure reports/results.    Comments __________________________________________________________  ____________________________________________________________________  ____________________________________________________________________  ____________________________________________________________________    Facility Completing Procedure _________________________________________    Form Completed By (print name) _______________________________________      Signature __________________________________________________________      These reports are needed for  compliance.    Please fax this completed form and a copy of the procedure report to our office located at [address] [place] [address] as soon as possible to Fax [phone] edna Valdez: Phone [phone]    We thank you for your assistance in treating our mutual patient.

## 2024-02-27 NOTE — TELEPHONE ENCOUNTER
Upon review of the In Basket request and the patient's chart, initial outreach has been made via fax to facility. Please see Contacts section for details.     Thank you  Courtney Infante

## 2024-03-05 NOTE — TELEPHONE ENCOUNTER
As a follow-up, a second attempt has been made for outreach via fax to facility. Please see Contacts section for details.    Thank you  Courtney Infante

## 2024-03-06 NOTE — TELEPHONE ENCOUNTER
As a final attempt, a third outreach has been made via telephone call to facility. Please see Contacts section for details. This encounter will be closed and completed by end of day. Should we receive the requested information because of previous outreach attempts, the requested patient's chart will be updated appropriately.     Will fax records ASAP    Thank you  Courtney Infante

## 2024-03-26 ENCOUNTER — APPOINTMENT (OUTPATIENT)
Dept: RADIOLOGY | Age: 69
End: 2024-03-26
Payer: COMMERCIAL

## 2024-03-26 DIAGNOSIS — G71.038 LIMB-GIRDLE MUSCULAR DYSTROPHY R21 ASSOCIATED WITH MUTATION IN POGLUT1 GENE (HCC): ICD-10-CM

## 2024-03-26 PROCEDURE — 71046 X-RAY EXAM CHEST 2 VIEWS: CPT

## 2024-05-12 ENCOUNTER — OCCMED (OUTPATIENT)
Dept: URGENT CARE | Age: 69
End: 2024-05-12
Payer: OTHER MISCELLANEOUS

## 2024-05-12 ENCOUNTER — APPOINTMENT (OUTPATIENT)
Dept: RADIOLOGY | Age: 69
End: 2024-05-12
Payer: OTHER MISCELLANEOUS

## 2024-05-12 DIAGNOSIS — M25.562 ACUTE PAIN OF LEFT KNEE: ICD-10-CM

## 2024-05-12 DIAGNOSIS — S80.02XA CONTUSION OF LEFT KNEE, INITIAL ENCOUNTER: Primary | ICD-10-CM

## 2024-05-12 PROCEDURE — 73564 X-RAY EXAM KNEE 4 OR MORE: CPT

## 2024-05-12 PROCEDURE — 99283 EMERGENCY DEPT VISIT LOW MDM: CPT

## 2024-05-12 PROCEDURE — G0382 LEV 3 HOSP TYPE B ED VISIT: HCPCS

## 2024-05-15 ENCOUNTER — APPOINTMENT (OUTPATIENT)
Dept: URGENT CARE | Age: 69
End: 2024-05-15
Payer: OTHER MISCELLANEOUS

## 2024-05-15 PROCEDURE — 99213 OFFICE O/P EST LOW 20 MIN: CPT

## 2024-05-22 ENCOUNTER — APPOINTMENT (OUTPATIENT)
Dept: URGENT CARE | Age: 69
End: 2024-05-22
Payer: OTHER MISCELLANEOUS

## 2024-05-22 PROCEDURE — 99213 OFFICE O/P EST LOW 20 MIN: CPT | Performed by: EMERGENCY MEDICINE

## 2024-05-24 ENCOUNTER — OFFICE VISIT (OUTPATIENT)
Dept: OBGYN CLINIC | Facility: MEDICAL CENTER | Age: 69
End: 2024-05-24
Payer: COMMERCIAL

## 2024-05-24 ENCOUNTER — APPOINTMENT (OUTPATIENT)
Dept: RADIOLOGY | Facility: MEDICAL CENTER | Age: 69
End: 2024-05-24
Payer: COMMERCIAL

## 2024-05-24 VITALS — HEIGHT: 69 IN | BODY MASS INDEX: 26.69 KG/M2 | WEIGHT: 180.2 LBS

## 2024-05-24 DIAGNOSIS — M25.561 RIGHT KNEE PAIN, UNSPECIFIED CHRONICITY: ICD-10-CM

## 2024-05-24 DIAGNOSIS — M25.562 LEFT KNEE PAIN, UNSPECIFIED CHRONICITY: ICD-10-CM

## 2024-05-24 DIAGNOSIS — M17.11 PRIMARY OSTEOARTHRITIS OF RIGHT KNEE: Primary | ICD-10-CM

## 2024-05-24 PROCEDURE — 99204 OFFICE O/P NEW MOD 45 MIN: CPT | Performed by: ORTHOPAEDIC SURGERY

## 2024-05-24 PROCEDURE — 73564 X-RAY EXAM KNEE 4 OR MORE: CPT

## 2024-05-24 PROCEDURE — 20610 DRAIN/INJ JOINT/BURSA W/O US: CPT | Performed by: ORTHOPAEDIC SURGERY

## 2024-05-24 RX ORDER — METHYLPREDNISOLONE ACETATE 40 MG/ML
1 INJECTION, SUSPENSION INTRA-ARTICULAR; INTRALESIONAL; INTRAMUSCULAR; SOFT TISSUE
Status: COMPLETED | OUTPATIENT
Start: 2024-05-24 | End: 2024-05-24

## 2024-05-24 RX ORDER — BUPIVACAINE HYDROCHLORIDE 2.5 MG/ML
4 INJECTION, SOLUTION INFILTRATION; PERINEURAL
Status: COMPLETED | OUTPATIENT
Start: 2024-05-24 | End: 2024-05-24

## 2024-05-24 RX ADMIN — METHYLPREDNISOLONE ACETATE 1 ML: 40 INJECTION, SUSPENSION INTRA-ARTICULAR; INTRALESIONAL; INTRAMUSCULAR; SOFT TISSUE at 11:15

## 2024-05-24 RX ADMIN — BUPIVACAINE HYDROCHLORIDE 4 ML: 2.5 INJECTION, SOLUTION INFILTRATION; PERINEURAL at 11:15

## 2024-05-24 NOTE — PROGRESS NOTES
"Orthopaedic Surgery - Office Note  Papito Gibson (69 y.o. male)   : 1955   MRN: [id number]  Encounter Date: 2024    Assessment / Plan    ***  {[facility] PLAN:12580}  Follow-up:  No follow-ups on file.      Chief Complaint / Date of Onset  ***  Injury Mechanism / Date  {NONE:09239::\"None\"}  Surgery / Date  {NONE:70607::\"None\"}    History of Present Illness   Papito Gibson is a 69 y.o. male who presents for ***    Treatment Summary  Medications / Modalities  {NONE:32947::\"None\"}  Bracing / Immobilization  {NONE:88571::\"None\"}  Physical Therapy  {NONE:03858::\"None\"}  Injections  {NONE:99856::\"None\"}  Prior Surgeries  {NONE:08312::\"None\"}  Other Treatments  {NONE:55358::\"None\"}    Employment / Current Status  ***    Sport / Organization / Current Status  ***      Review of Systems  {[facility] ROS COMPLETE:76153}      Physical Exam  Ht 5' 9\" (1.753 m)   Wt 81.7 kg (180 lb 3.2 oz)   BMI 26.61 kg/m²   Cons: Appears well.  No apparent distress.  Psych: Alert. Oriented x3.  Mood and affect normal.  Eyes: PERRLA, EOMI  Resp: Normal effort.  No audible wheezing or stridor.  CV: Palpable pulse.  No discernable arrhythmia.  {PE EDEMA:66140::\"No LE edema.\"}  Lymph:  No palpable cervical, axillary, or inguinal lymphadenopathy.  Skin: Warm.  No palpable masses.  No visible lesions.  Neuro: Normal muscle tone.  Normal and symmetric DTR's.     ***      Studies Reviewed  {STUDIES REVIEWED:22461}      Procedures  {NO PROCDOC:27451}    Medical, Surgical, Family, and Social History  The patient's medical history, family history, and social history, were reviewed and updated as appropriate.    History reviewed. No pertinent past medical history.    History reviewed. No pertinent surgical history.    Family History   Problem Relation Age of Onset    Lung cancer Father     Nephrolithiasis Brother     No Known Problems Mother        Social History     Occupational History    Not on file   Tobacco Use    Smoking status: " Passive Smoke Exposure - Never Smoker    Smokeless tobacco: Current     Types: Chew    Tobacco comments:     passive smoke exposure   Vaping Use    Vaping status: Never Used   Substance and Sexual Activity    Alcohol use: No    Drug use: No    Sexual activity: Not on file       No Known Allergies      Current Outpatient Medications:     Azelastine HCl 137 MCG/SPRAY SOLN, , Disp: , Rfl:     clobetasol (TEMOVATE) 0.05 % cream, Apply to affected areas below the neck once a day as needed., Disp: 30 g, Rfl: 0    folic acid (FOLVITE) 1 mg tablet, Take 1 mg by mouth daily, Disp: , Rfl:     rosuvastatin (CRESTOR) 20 MG tablet, Take 1 tablet (20 mg total) by mouth daily, Disp: 30 tablet, Rfl: 3    cephalexin (KEFLEX) 500 mg capsule, Take 500 mg by mouth every 8 (eight) hours (Patient not taking: Reported on 2/8/2024), Disp: , Rfl:     clobetasol propionate (CLOBEX) 0.05 % lotion, Apply 1 Application topically 2 (two) times a day for 7 days (Patient not taking: Reported on 2/26/2024), Disp: 59 mL, Rfl: 0    methocarbamol (ROBAXIN) 500 mg tablet, Take 1 tablet (500 mg total) by mouth 3 (three) times a day as needed for muscle spasms for up to 5 days (Patient not taking: Reported on 2/26/2024), Disp: 15 tablet, Rfl: 0    ondansetron (ZOFRAN) 4 mg tablet, Take 1 tablet (4 mg total) by mouth every 8 (eight) hours as needed for nausea or vomiting (Patient not taking: Reported on 2/8/2024), Disp: 12 tablet, Rfl: 0      Vane Willis    Scribe Attestation      I,:   am acting as a scribe while in the presence of the attending physician.:       I,:   personally performed the services described in this documentation    as scribed in my presence.:

## 2024-05-24 NOTE — PROGRESS NOTES
"Orthopaedic Surgery - Office Note  Rayne Gibson (69 y.o. male)   : 1955   MRN: [id number]  Encounter Date: 2024    Assessment / Plan  Right knee OA    CSI of right knee joint was performed  We discussed the non-surgical treatment for OA, all of patients questions were answered   Continue with soft knee sleeve  Activity as tolerated, low impact exercises recommended  Referral given to begin PT for hip, thigh and core strengthening  Ordered and reviewed XR during the visit   Ice, heat and anti-inflammatories prn   Follow-up:  Return if symptoms worsen or fail to improve.      Chief Complaint / Date of Onset  Right knee pain, no injury, since beginning of May   Injury Mechanism / Date  None  Surgery / Date  None    History of Present Illness   Rayne Gibson is a 69 y.o. male who presents for evaluation of right knee pain. He states this pain began with no inciting event. He describes this pain as being medial and worsening with WB activity and prolonged sitting. He does get some swelling with prolong standing and walking. He does use a knee sleeve for comfort and swelling management. He denies any mechanical symptoms. He has had no other formal treatments.     Ipad was used for translation during the visit (Mery dialectic)     Treatment Summary  Medications / Modalities  Aleve prn with mild relief  Bracing / Immobilization  None  Physical Therapy  None  Injections  None  Prior Surgeries  None  Other Treatments  None    Employment / Current Status  Works at [employer]    Sport / Organization / Current Status  active      Review of Systems  Pertinent items are noted in HPI.  All other systems were reviewed and are negative.      Physical Exam  Ht 5' 9\" (1.753 m)   Wt 81.7 kg (180 lb 3.2 oz)   BMI 26.61 kg/m²   Cons: Appears well.  No apparent distress.  Psych: Alert. Oriented x3.  Mood and affect normal.  Eyes: PERRLA, EOMI  Resp: Normal effort.  No audible wheezing or " "stridor.  CV: Palpable pulse.  No discernable arrhythmia.  No LE edema.  Lymph:  No palpable cervical, axillary, or inguinal lymphadenopathy.  Skin: Warm.  No palpable masses.  No visible lesions.  Neuro: Normal muscle tone.  Normal and symmetric DTR's.     Right Knee Exam  Alignment:  Normal knee alignment.  Inspection:  No swelling. No ecchymosis.  Palpation:   mild medial joint line tenderness. small effusion.  ROM:  Knee Extension 0. Knee Flexion 125.  Strength:  Able to actively extend knee against gravity.  Stability:  No objective knee instability. Stable Varus / Valgus stress, Lachman, and Posterior drawer.  Tests:  (+) Ann.  Patella:  Normal patellar mobility.  Neurovascular:  Sensation intact in DP/SP/Velasquez/Sa/T nerve distributions.  2+ DP & PT pulses.  Gait:  Normal.       Studies Reviewed  I have personally reviewed pertinent films in PACS.  XR of right knee - images from 05/24/2024  mild arthritic changes   XR of left knee- images from 05/24/2024 osteochondroma seen on proximal fibula    Large joint arthrocentesis: R knee  Universal Protocol:  Consent given by: patient  Time out: Immediately prior to procedure a \"time out\" was called to verify the correct patient, procedure, equipment, support staff and site/side marked as required.  Site marked: the operative site was marked  Supporting Documentation  Indications: pain and diagnostic evaluation   Procedure Details  Location: knee - R knee  Preparation: Patient was prepped and draped in the usual sterile fashion  Needle size: 22 G  Ultrasound guidance: no  Approach: lateral  Medications administered: 4 mL bupivacaine 0.25 %; 1 mL methylPREDNISolone acetate 40 mg/mL    Patient tolerance: patient tolerated the procedure well with no immediate complications  Dressing:  Sterile dressing applied          Medical, Surgical, Family, and Social History  The patient's medical history, family history, and social history, were reviewed and updated as " appropriate.    History reviewed. No pertinent past medical history.    History reviewed. No pertinent surgical history.    Family History   Problem Relation Age of Onset    Lung cancer Father     Nephrolithiasis Brother     No Known Problems Mother        Social History     Occupational History    Not on file   Tobacco Use    Smoking status: Passive Smoke Exposure - Never Smoker    Smokeless tobacco: Current     Types: Chew    Tobacco comments:     passive smoke exposure   Vaping Use    Vaping status: Never Used   Substance and Sexual Activity    Alcohol use: No    Drug use: No    Sexual activity: Not on file       No Known Allergies      Current Outpatient Medications:     Azelastine HCl 137 MCG/SPRAY SOLN, , Disp: , Rfl:     clobetasol (TEMOVATE) 0.05 % cream, Apply to affected areas below the neck once a day as needed., Disp: 30 g, Rfl: 0    folic acid (FOLVITE) 1 mg tablet, Take 1 mg by mouth daily, Disp: , Rfl:     rosuvastatin (CRESTOR) 20 MG tablet, Take 1 tablet (20 mg total) by mouth daily, Disp: 30 tablet, Rfl: 3    cephalexin (KEFLEX) 500 mg capsule, Take 500 mg by mouth every 8 (eight) hours (Patient not taking: Reported on 2/8/2024), Disp: , Rfl:     clobetasol propionate (CLOBEX) 0.05 % lotion, Apply 1 Application topically 2 (two) times a day for 7 days (Patient not taking: Reported on 2/26/2024), Disp: 59 mL, Rfl: 0    methocarbamol (ROBAXIN) 500 mg tablet, Take 1 tablet (500 mg total) by mouth 3 (three) times a day as needed for muscle spasms for up to 5 days (Patient not taking: Reported on 2/26/2024), Disp: 15 tablet, Rfl: 0    ondansetron (ZOFRAN) 4 mg tablet, Take 1 tablet (4 mg total) by mouth every 8 (eight) hours as needed for nausea or vomiting (Patient not taking: Reported on 2/8/2024), Disp: 12 tablet, Rfl: 0      Salud Mueller    Scribe Attestation      I,:  Salud Mueller am acting as a scribe while in the presence of the attending physician.:       I,:  Tanner Goetz MD  personally performed the services described in this documentation    as scribed in my presence.:

## 2024-05-28 ENCOUNTER — TELEPHONE (OUTPATIENT)
Age: 69
End: 2024-05-28

## 2024-05-28 NOTE — TELEPHONE ENCOUNTER
Caller: Rayne    Doctor: ilene    Reason for call: Patient had a CSI 5/24. Patient went to the Urgent Care due to pain in the injection site. Patient is having difficulty with walking.  Please advise patient     Call back#: [phone]

## 2024-05-29 ENCOUNTER — OFFICE VISIT (OUTPATIENT)
Dept: OBGYN CLINIC | Facility: CLINIC | Age: 69
End: 2024-05-29
Payer: COMMERCIAL

## 2024-05-29 VITALS
DIASTOLIC BLOOD PRESSURE: 68 MMHG | WEIGHT: 180 LBS | BODY MASS INDEX: 26.66 KG/M2 | HEIGHT: 69 IN | SYSTOLIC BLOOD PRESSURE: 136 MMHG

## 2024-05-29 DIAGNOSIS — M17.11 PRIMARY OSTEOARTHRITIS OF RIGHT KNEE: Primary | ICD-10-CM

## 2024-05-29 PROCEDURE — 20610 DRAIN/INJ JOINT/BURSA W/O US: CPT | Performed by: PHYSICIAN ASSISTANT

## 2024-05-29 PROCEDURE — 99213 OFFICE O/P EST LOW 20 MIN: CPT | Performed by: PHYSICIAN ASSISTANT

## 2024-05-29 RX ORDER — METHYLPREDNISOLONE 4 MG/1
TABLET ORAL
Qty: 21 TABLET | Refills: 0 | Status: SHIPPED | OUTPATIENT
Start: 2024-05-29

## 2024-05-29 RX ORDER — MELOXICAM 15 MG/1
15 TABLET ORAL DAILY
Qty: 30 TABLET | Refills: 0 | Status: SHIPPED | OUTPATIENT
Start: 2024-05-29

## 2024-05-29 NOTE — LETTER
May 29, 2024     Patient: Rayne Gibson  YOB: 1955  Date of Visit: 5/29/2024      To Whom it May Concern:    Rayne Gibson is under my professional care. Rayne was seen in my office on 5/29/2024. He is out of work until 6/5/24. Please allow patient to sit while at work. Restrictions in placed until next evaluation in 6 weeks.    If you have any questions or concerns, please don't hesitate to call.         Sincerely,          Gonzales Brunner PA-C

## 2024-05-29 NOTE — PROGRESS NOTES
Patient Name:  Rayne Gibson  MRN:  [id number]    Assessment & Plan     Right knee DJD status post intra-articular corticosteroid injection 5/24/2024 by Dr. Goetz with steroid flare.  Patient did exhibit a moderate effusion on examination today.  Aspiration was performed in the office yielding 45 mL of clear yellow fluid.  Synovial fluid will be sent for Gram stain and culture, crystal analysis, and white blood cell count.  Patient is exhibiting signs and symptoms of steroid flare after recent intra-articular corticosteroid injection.  I will prescribe a Medrol Dosepak to help with his pain.  Prescription also provided for meloxicam to be initiated after Medrol Dosepak is complete.  Advised against taking any additional NSAIDs while taking Medrol Dosepak and meloxicam.  Work note provided at patient's request.  Proceed with physical therapy as scheduled.  Follow-up in 6 weeks with Dr. Goetz.    Mery  utilized during the encounter today.    Chief Complaint     Right knee pain    History of the Present Illness     Rayne Gibson is a 69 y.o. male who reports to the office today for evaluation of his right knee.  He was initially seen by Dr. Goetz on 5/24/2024.  At that time he received an intra-articular corticosteroid injection into the right knee.  He noted no improvement after the injection but notes rather worsening pain recently over the past day or 2.  He denies any injury or trauma after receiving the intra-articular corticosteroid injection.  He attributes his initial pain to a work injury that occurred a few weeks ago.  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  He notes severe 10 of 10 pain in the right knee with associated swelling and stiffness.  Pain is worse with all use movement and weightbearing activity.  He notes weakness due to the pain.  No instability.  No numbness or tingling.  No fevers or chills.  He has been taking Aleve intermittently without significant  "improvement.    Physical Exam     /68   Ht 5' 9\" (1.753 m)   Wt 81.6 kg (180 lb)   BMI 26.58 kg/m²     Right knee: Skin intact.  No erythema ecchymosis or swelling.  Moderate effusion.  Diffuse generalized tenderness to palpation.  Range of motion is full extension and flexion to 100 degrees with pain at terminal flexion.  Stable to varus and valgus stress without pain.  Stable Lachman test.  Negative posterior drawer test.  Negative Dee's test.  Extensor mechanism is intact.  Sensation is intact distally.    Eyes: Anicteric sclerae.  ENT: Trachea midline.  Lungs: Normal respiratory effort.  CV: Capillary refill is less than 2 seconds.  Skin: Intact without erythema.  Lymph: No palpable lymphadenopathy.  Neuro: Sensation is grossly intact to light touch.  Psych: Mood and affect are appropriate.    History reviewed. No pertinent past medical history.    History reviewed. No pertinent surgical history.    No Known Allergies    Current Outpatient Medications on File Prior to Visit   Medication Sig Dispense Refill    Azelastine HCl 137 MCG/SPRAY SOLN       cephalexin (KEFLEX) 500 mg capsule Take 500 mg by mouth every 8 (eight) hours (Patient not taking: Reported on 2/8/2024)      clobetasol (TEMOVATE) 0.05 % cream Apply to affected areas below the neck once a day as needed. 30 g 0    clobetasol propionate (CLOBEX) 0.05 % lotion Apply 1 Application topically 2 (two) times a day for 7 days (Patient not taking: Reported on 2/26/2024) 59 mL 0    folic acid (FOLVITE) 1 mg tablet Take 1 mg by mouth daily      methocarbamol (ROBAXIN) 500 mg tablet Take 1 tablet (500 mg total) by mouth 3 (three) times a day as needed for muscle spasms for up to 5 days (Patient not taking: Reported on 2/26/2024) 15 tablet 0    ondansetron (ZOFRAN) 4 mg tablet Take 1 tablet (4 mg total) by mouth every 8 (eight) hours as needed for nausea or vomiting (Patient not taking: Reported on 2/8/2024) 12 tablet 0    rosuvastatin (CRESTOR) 20 MG " tablet Take 1 tablet (20 mg total) by mouth daily 30 tablet 3     No current facility-administered medications on file prior to visit.       Social History     Tobacco Use    Smoking status: Passive Smoke Exposure - Never Smoker    Smokeless tobacco: Current     Types: Chew    Tobacco comments:     passive smoke exposure   Vaping Use    Vaping status: Never Used   Substance Use Topics    Alcohol use: No    Drug use: No       Family History   Problem Relation Age of Onset    Lung cancer Father     Nephrolithiasis Brother     No Known Problems Mother        Review of Systems     As stated in the HPI. All other systems reviewed and are negative.      Large joint arthrocentesis: R knee  Procedure Details  Location: knee - R knee  Needle size: 18 G  Ultrasound guidance: no  Approach: superior    Aspirate amount: 45 mL  Aspirate: clear and yellow  Analysis: fluid sample sent for laboratory analysis  Patient tolerance: patient tolerated the procedure well with no immediate complications  Dressing:  Sterile dressing applied

## 2024-05-29 NOTE — TELEPHONE ENCOUNTER
Looks like patient is scheduled to see Gonzales Brunner this afternoon for evaluation of his pain.  I recommendation would be to continue with ice and analgesics and follow-up for reevaluation if needed.  But seeing Gonzales will definitely be acceptable.

## 2024-05-30 LAB — CRYSTALS SNV QL MICRO: NORMAL

## 2024-05-30 PROCEDURE — 89060 EXAM SYNOVIAL FLUID CRYSTALS: CPT | Performed by: PHYSICIAN ASSISTANT

## 2024-05-30 PROCEDURE — 89051 BODY FLUID CELL COUNT: CPT | Performed by: PHYSICIAN ASSISTANT

## 2024-05-30 PROCEDURE — 87070 CULTURE OTHR SPECIMN AEROBIC: CPT | Performed by: PHYSICIAN ASSISTANT

## 2024-05-30 PROCEDURE — 87205 SMEAR GRAM STAIN: CPT | Performed by: PHYSICIAN ASSISTANT

## 2024-05-31 LAB
APPEARANCE FLD: CLEAR
COLOR FLD: ABNORMAL
LYMPHOCYTES # SNV MANUAL: 12 %
MONOCYTES NFR SNV MANUAL: 81 %
NEUTROPHILS NFR SNV MANUAL: 6 %
SITE: ABNORMAL
SYNOVIOCYTES NFR SNV: 1 %
TOTAL CELLS COUNTED SPEC: 100
WBC # FLD MANUAL: 301 /UL (ref 0–200)

## 2024-06-02 LAB
BACTERIA SPEC BFLD CULT: NO GROWTH
GRAM STN SPEC: NORMAL

## 2024-06-03 ENCOUNTER — OFFICE VISIT (OUTPATIENT)
Dept: FAMILY MEDICINE CLINIC | Facility: CLINIC | Age: 69
End: 2024-06-03
Payer: COMMERCIAL

## 2024-06-03 VITALS
HEART RATE: 77 BPM | DIASTOLIC BLOOD PRESSURE: 70 MMHG | WEIGHT: 177 LBS | SYSTOLIC BLOOD PRESSURE: 120 MMHG | OXYGEN SATURATION: 98 % | HEIGHT: 70 IN | BODY MASS INDEX: 25.34 KG/M2 | TEMPERATURE: 97.3 F

## 2024-06-03 DIAGNOSIS — E78.5 HYPERLIPIDEMIA, UNSPECIFIED HYPERLIPIDEMIA TYPE: Primary | ICD-10-CM

## 2024-06-03 DIAGNOSIS — K42.9 UMBILICAL HERNIA WITHOUT OBSTRUCTION AND WITHOUT GANGRENE: ICD-10-CM

## 2024-06-03 PROBLEM — M33.90 POLYMYOSITIS-DERMATOMYOSITIS (HCC): Status: ACTIVE | Noted: 2024-06-03

## 2024-06-03 PROCEDURE — 99214 OFFICE O/P EST MOD 30 MIN: CPT | Performed by: INTERNAL MEDICINE

## 2024-06-03 RX ORDER — CHOLECALCIFEROL (VITAMIN D3) 1250 MCG
CAPSULE ORAL
COMMUNITY
Start: 2024-03-01

## 2024-06-03 RX ORDER — ROSUVASTATIN CALCIUM 20 MG/1
20 TABLET, COATED ORAL DAILY
Qty: 30 TABLET | Refills: 3 | Status: SHIPPED | OUTPATIENT
Start: 2024-06-03 | End: 2024-06-06 | Stop reason: SDUPTHER

## 2024-06-03 NOTE — PROGRESS NOTES
"Assessment/Plan:           Problem List Items Addressed This Visit    None  Visit Diagnoses       Hyperlipidemia, unspecified hyperlipidemia type    -  Primary    Relevant Medications    rosuvastatin (CRESTOR) 20 MG tablet    Umbilical hernia without obstruction and without gangrene        Relevant Orders    Ambulatory Referral to General Surgery              Subjective:      Patient ID: UpendLuanne Gibson is a 69 y.o. male.    HPI  Patient with hyperlipidemia is here to follow-up on chronic medical problems.  Unfortunately, his tone taking Crestor 2 months ago.  He did not go for lab studies.  We discussed this in detail.  Patient will resume the medication we will follow-up in a month.  He was also referred to rheumatology for possibility of polymyositis dermatomyositis.  Was found to be more indicated for psoriasis dermatitis and was referred to dermatology by rheumatology but no biopsy at that time was performed.  Patient was asked to follow-up if it flares up.  Patient reports the knee arthritis to be stable at this time.  His uric acid levels were normal.   Patient also complains of umbilical hernia that seems to have grown in size however it is not obstructed at this time.      The following portions of the patient's history were reviewed and updated as appropriate: allergies, current medications, past medical history, past social history, and problem list.    Review of Systems      Objective:      /70 (BP Location: Right arm, Patient Position: Sitting, Cuff Size: Standard)   Pulse 77   Temp (!) 97.3 °F (36.3 °C) (Temporal)   Ht 5' 10\" (1.778 m)   Wt 80.3 kg (177 lb)   SpO2 98%   BMI 25.40 kg/m²          Physical Exam  Cardiovascular:      Rate and Rhythm: Normal rate and regular rhythm.      Heart sounds: Normal heart sounds. No murmur heard.  Pulmonary:      Effort: No respiratory distress.      Breath sounds: No wheezing or rales.   Abdominal:      Hernia: A hernia (Umbilical hernia) is " present.   Neurological:      Mental Status: He is alert.

## 2024-06-04 ENCOUNTER — TELEPHONE (OUTPATIENT)
Age: 69
End: 2024-06-04

## 2024-06-04 ENCOUNTER — EVALUATION (OUTPATIENT)
Dept: PHYSICAL THERAPY | Facility: MEDICAL CENTER | Age: 69
End: 2024-06-04
Payer: COMMERCIAL

## 2024-06-04 DIAGNOSIS — M17.11 PRIMARY OSTEOARTHRITIS OF RIGHT KNEE: ICD-10-CM

## 2024-06-04 DIAGNOSIS — M25.561 RIGHT KNEE PAIN, UNSPECIFIED CHRONICITY: Primary | ICD-10-CM

## 2024-06-04 PROCEDURE — 97161 PT EVAL LOW COMPLEX 20 MIN: CPT | Performed by: PHYSICAL THERAPIST

## 2024-06-04 NOTE — TELEPHONE ENCOUNTER
Patient called very confused as to why he couldn't get his medication and he thinks it wasn't sent. I tried to explain to him it needs prior authorization through his insurance and he won't be able to pick it up until that is completed, but he was not understanding. Please advise and start the prior authorization.     Reason for call:   [x] Refill   [] Prior Auth  [] Other:     Office:   [x] PCP/Provider -   [] Specialty/Provider -     Medication: Rosuvastatin     Dose/Frequency: 20 mg tablet taken by mouth once daily     Quantity: 90    Pharmacy: [id number]  [place]  [address] [facility] [phone]     Does the patient have enough for 3 days?   [] Yes   [x] No - Send as HP to POD

## 2024-06-04 NOTE — PROGRESS NOTES
PT Evaluation     Today's date: 2024  Patient name: Rayne Gibson  : 1955  MRN: [id number]  Referring provider: Tanner Goetz, *  Dx:   Encounter Diagnosis     ICD-10-CM    1. Right knee pain, unspecified chronicity  M25.561 Ambulatory Referral to Physical Therapy      2. Primary osteoarthritis of right knee  M17.11 Ambulatory Referral to Physical Therapy          Start Time: 830  Stop Time: 925  Total time in clinic (min): 55 minutes    Assessment  Impairments: abnormal coordination, abnormal muscle firing, abnormal or restricted ROM, activity intolerance, impaired physical strength, lacks appropriate home exercise program, pain with function and activity limitations  Symptom irritability: low    Assessment details: Rayne Gibson is a 69 y.o. male was evaluated on 2024  for Right knee pain, unspecified chronicity Primary osteoarthritis of right knee. Rayne Gibson has the below listed impairments resulting in functional deficits and negative impact to quality of life.  Patient is appropriate for skilled PT intervention to promote maximal return to function and patient specific goals.      Patient agrees with outlined treatment plan and all questions were answered to their satisfaction.      Barriers to therapy: none  Understanding of Dx/Px/POC: good     Prognosis: good    Goals  - Pt I with initial HEP in 1-2 visits  - Improve ROM equal to contralateral side in 4-6 weeks  - Increase strength to 5/5 in all affected areas in 4-6 weeks  - Stair climbing is improved to prior level of function in 6-8 weeks  - Pt will be able to manage their symptoms independently        Plan  Patient would benefit from: skilled physical therapy  Referral necessary: No  Planned modality interventions: thermotherapy: hydrocollator packs and cryotherapy    Planned therapy interventions: functional ROM exercises, graded activity, home exercise program, therapeutic activities, therapeutic  exercise, strengthening, stretching, patient/caregiver education, neuromuscular re-education and muscle pump exercises    Frequency: 1x week  Duration in weeks: 12  Treatment plan discussed with: patient  Plan details: Patient reported that his son will be calling to set up his next appointment in 1-2 weeks.      Subjective Evaluation    History of Present Illness  Mechanism of injury: Rayne Gibson is a 69 y.o. male presenting to therapy with complaints of right knee pain. Patient reports most of their pain is located medially. Rayne reports that a couple of weeks ago he started to have some pain in his right knee. X-rays revealed moderate joint effusion, no acute fracture or dislocation. Patient received a CSI on , resulting in pain in his  right knee. On  patient went in for aspiration of right knee and reports since that his knee pain has decreased significantly.  Patient is currently still working and does not use an AD for ambulation.     Date of onset:  May 2024  Symptom AGGS:  climbing stairs, walking for long distances, staying in the same position for a prolonged period of time  Symptom EASES: rest, movement, change in position, ice  Prior Treatment:  CSI  Relevant PMH:  none   Prior Imaging: x-ray    Occupation:  dealer at [employer]  Activity goals: work without pain, climb stairs without pain                     Not a recurrent problem   Quality of life: good    Patient Goals  Patient goals for therapy: decreased pain, increased motion, increased strength, return to sport/leisure activities and return to work    Pain  Current pain rating: 3  At best pain ratin  At worst pain ratin  Quality: dull ache, pressure, tight and pulling  Relieving factors: change in position, ice and relaxation  Aggravating factors: sitting, standing, stair climbing and walking    Social Support  Stairs in house: yes       Diagnostic Tests  X-ray: abnormal    FCE comments: No acute osseous abnormality  "considering patient's age.  Moderate effusion.     Treatments  Previous treatment: injection treatment      Objective     Tenderness     Right Knee   Tenderness in the pes anserinus.     Neurological Testing     Sensation     Knee   Left Knee   Intact: Light touch    Right Knee   Intact: light touch     Reflexes   Left   Patellar (L4): trace (1+)  Achilles (S1): trace (1+)    Right   Patellar (L4): trace (1+)  Achilles (S1): trace (1+)    Additional Neurological Details  Difficult to assess due to patient not relaxing his muscles. Denies numbness and tingling in LE.    Active Range of Motion   Left Knee   Flexion: 140 degrees   Extension: 0 degrees     Right Knee   Flexion: 115 degrees with pain  Extension: 0 degrees     Passive Range of Motion   Left Hip   Flexion: WFL  Abduction: WFL  External rotation (90/90): WFL  Internal rotation (90/90): WFL    Right Hip   Flexion: WFL and with pain  Abduction: WFL  External rotation (90/90): WFL  Internal rotation (90/90): WFL  Left Knee   Normal passive range of motion    Right Knee   Flexion: 120 degrees with pain  Extension: 0 degrees     Additional Passive Range of Motion Details  \"Pulling\" in hamstrings during passive R hip flexion    Strength/Myotome Testing     Left Hip   Planes of Motion   Flexion: 4+  Abduction: 4  Adduction: 4    Right Hip   Planes of Motion   Flexion: 4  Abduction: 4  Adduction: 4    Left Knee   Flexion: 4-  Extension: 4+    Right Knee   Flexion: 4-  Extension: 4  Quadriceps contraction: good    Left Ankle/Foot   Dorsiflexion: WFL.   Plantar flexion: WFL.     Right Ankle/Foot   Dorsiflexion: WFL.   Plantar flexion: WFL.            Precautions: none    HEP: heel slides, quad sets, SLR, hamstring stretch    Manuals 6/4                                                                Neuro Re-Ed                                                                                                        Ther Ex             Quad set             Heel slides     "         SLR             Hamstring stretch                                                                  Ther Activity                                       Gait Training                                       Modalities

## 2024-06-05 ENCOUNTER — TELEPHONE (OUTPATIENT)
Dept: FAMILY MEDICINE CLINIC | Facility: CLINIC | Age: 69
End: 2024-06-05

## 2024-06-05 NOTE — TELEPHONE ENCOUNTER
Pt stopped in asking about his medication and stated his medication refill was sent to the wrong pharmacy, rosuvastatin 20mg. Pt is requesting a refill be sent to the [facility] in [place] on file. Pt is requesting a follow up once complete.

## 2024-06-06 DIAGNOSIS — E78.5 HYPERLIPIDEMIA, UNSPECIFIED HYPERLIPIDEMIA TYPE: ICD-10-CM

## 2024-06-06 PROBLEM — D51.0 PERNICIOUS ANEMIA: Status: ACTIVE | Noted: 2023-09-26

## 2024-06-06 RX ORDER — ROSUVASTATIN CALCIUM 20 MG/1
20 TABLET, COATED ORAL DAILY
Qty: 30 TABLET | Refills: 3 | Status: SHIPPED | OUTPATIENT
Start: 2024-06-06

## 2024-06-06 NOTE — PROGRESS NOTES
Assessment/Plan:   Rayne Gibson is a 69 y.o.male who is here for   Chief Complaint   Patient presents with    Hernia     Ongoing 2-3 years     H/o pernicious anemia - stable      Plan: Robotic repair of Umbilical Hernia.       Post Op Pain Management:     Norco, Motrin, and Tylenol    Preoperative Clearance: None      - Patient has been instructed to avoid herbs or non-directed vitamins the week prior to surgery to ensure no drug interactions with perioperative surgical and anesthetic medications.  - Patient should continue beta-blocker medication up through and including the day of surgery but hold any other hypertensive medications, including diuretics, unless instructed by PCP or anesthesia  - Patient should continue his statin medication up through and including the day of surgery.  - Hold metformin , If on this medication, the morning of surgery and do not resume until 48 hours AFTER surgery to avoid risk of lactic acidosis. Do not resume if eGFR is < 30  - Insulin Management:If on Insulin, patient advised to call PCP for explicit instructions. In general, will need to take one-half normal dose am of surgery but pt advised to consult PCP before making any changes.   - Patient has been instructed to avoid aspirin containing medications or non-steroidal anti-inflammatory drugs for SEVEN days preceding surgery.    Imaging: CT 2019:  There is a small to moderate size fat-containing umbilical hernia     In preparation for this visit all relevant and known prior office notes, prior consultations, emergency room visits, blood work results, and imaging studies were personally reviewed.  A total of  30 minutes was spent reviewing all of this information,caring for this patient, providing differential diagnosis, instructions for management, counseling and coordination of care.  This also includes planning surgical intervention where indicated.    Patient understands hernia occurrence or re-occurrence risk is  higher in a diabetic, tobacco user, with elevated BMIs.   _____________________________________________      HPI:  Rayne Gibson is a 69 y.o.male who was referred for evaluation of Hernia (Ongoing 2-3 years)  .    Currently complaining of  persistent Umbilical Hernia worse with bending, coughing, lifting, standing, walking, no nausea and no vomiting, regular bowel movement. Duration of pain or symptoms:  over 2 years and no pain       Prior abdominal surgery:   None    BMI: Body mass index is 25.25 kg/m².     Tobacco use:   Tobacco Use: High Risk (6/10/2024)    Patient History     Smoking Tobacco Use: Never     Smokeless Tobacco Use: Current     Passive Exposure: Yes        Lab Results   Component Value Date    WBC 4.10 (L) 03/25/2021    HGB 12.7 03/25/2021    HCT 39.0 03/25/2021    MCV 92 03/25/2021     03/25/2021     Lab Results   Component Value Date    K 4.2 01/04/2024     01/04/2024    CO2 23 01/04/2024    BUN 11 01/04/2024    CREATININE 0.83 01/04/2024    GLUCOSE 123 04/05/2019    CALCIUM 9.8 01/04/2024    AST 15 01/04/2024    ALT 16 01/04/2024    ALKPHOS 44 01/04/2024    EGFR 96 01/04/2024     Lab Results   Component Value Date    INR 0.96 04/05/2019    PROTIME 12.9 04/05/2019       Smoking Status: Non-smoker     ROS:  General ROS: negative  negative for - chills, fatigue, fever or night sweats, weight loss  Respiratory ROS: no cough, shortness of breath, or wheezing  Cardiovascular ROS: no chest pain or dyspnea on exertion  Genito-Urinary ROS: no dysuria, trouble voiding, or hematuria  Musculoskeletal ROS: negative for - gait disturbance, joint pain or muscle pain  Neurological ROS: no TIA or stroke symptoms  Abdominal ROS: see HPI  GI ROS: see HPI  Skin ROS: no new rashes or lesions   Lymphatic ROS: no new adenopathy noted by pt.   GYN ROS: see HPI, no new GYN history or bleeding noted  Psy ROS: no new mental or behavioral disturbances       Patient Active Problem List   Diagnosis    DDD  (degenerative disc disease), cervical    Chronic sinusitis    Gastroesophageal reflux disease    Idiopathic osteoarthritis    Vitiligo    Polymyositis-dermatomyositis (HCC)    Pernicious anemia         Allergies: Patient has no known allergies.    Meds:  Current Outpatient Medications:     Azelastine HCl 137 MCG/SPRAY SOLN, , Disp: , Rfl:     rosuvastatin (CRESTOR) 20 MG tablet, Take 1 tablet (20 mg total) by mouth daily, Disp: 30 tablet, Rfl: 3    cephalexin (KEFLEX) 500 mg capsule, Take 500 mg by mouth every 8 (eight) hours (Patient not taking: Reported on 2/8/2024), Disp: , Rfl:     Cholecalciferol (Vitamin D3) 1.25 MG (46584 UT) CAPS, 1 CAPSULY BY MOUTH EVERY 14 DAYS (Patient not taking: Reported on 6/3/2024), Disp: , Rfl:     clobetasol (TEMOVATE) 0.05 % cream, Apply to affected areas below the neck once a day as needed. (Patient not taking: Reported on 6/3/2024), Disp: 30 g, Rfl: 0    clobetasol propionate (CLOBEX) 0.05 % lotion, Apply 1 Application topically 2 (two) times a day for 7 days (Patient not taking: Reported on 2/26/2024), Disp: 59 mL, Rfl: 0    folic acid (FOLVITE) 1 mg tablet, Take 1 mg by mouth daily (Patient not taking: Reported on 6/3/2024), Disp: , Rfl:     meloxicam (Mobic) 15 mg tablet, Take 1 tablet (15 mg total) by mouth daily (Patient not taking: Reported on 6/3/2024), Disp: 30 tablet, Rfl: 0    methocarbamol (ROBAXIN) 500 mg tablet, Take 1 tablet (500 mg total) by mouth 3 (three) times a day as needed for muscle spasms for up to 5 days (Patient not taking: Reported on 2/26/2024), Disp: 15 tablet, Rfl: 0    methylPREDNISolone 4 MG tablet therapy pack, Use as directed on package (Patient not taking: Reported on 6/3/2024), Disp: 21 tablet, Rfl: 0    ondansetron (ZOFRAN) 4 mg tablet, Take 1 tablet (4 mg total) by mouth every 8 (eight) hours as needed for nausea or vomiting (Patient not taking: Reported on 2/8/2024), Disp: 12 tablet, Rfl: 0    PMH:No past medical history on file.    PSH:No  past surgical history on file.    Family History   Problem Relation Age of Onset    Lung cancer Father     Nephrolithiasis Brother     No Known Problems Mother         reports that he has never smoked. He has been exposed to tobacco smoke. His smokeless tobacco use includes chew. He reports that he does not drink alcohol and does not use drugs.    Vitals:    06/10/24 0836   BP: 122/64   Pulse: 87   Resp: 16   Temp: 98.6 °F (37 °C)   SpO2: 98%       PHYSICAL EXAM  General Appearance:    Alert, cooperative, no distress,    Head:    Normocephalic without obvious abnormality   Eyes:    PERRL, conjunctiva/corneas clear,      Neck:   Supple, no adenopathy, no JVD   Back:     Symmetric, no spinal or CVA tenderness   Lungs:     Clear to auscultation bilaterally, no wheezing or rhonchi   Heart:    Regular rate and rhythm, S1 and S2 normal, no murmur   Abdomen:      abdomen is soft without significant tenderness, masses, organomegaly or guarding     umbilical hernia  The hernia is, easily reducible,   Extremities:   Extremities normal. No clubbing, cyanosis or edema   Psych:   Normal Affect, AOx3.    Neurologic:  Skin:   CNII-XII intact. Strength symmetric, speech intact    Warm, dry, intact, no visible rashes or lesions                   Informed consent for procedure was personally discussed, reviewed, and signed by Dr. Kim. Discussion by Dr. Kim was carried out regarding risks, benefits, and alternatives with the patient. Risks include but are not limited to:  bleeding, infection, and delayed wound healing or an open wound, pulmonary embolus, leaks from bowel or bile ducts or other viscus, transfusions, death.  Discussed in further detail the more common complications and their rates of occurrence.   was used if necessary.  Patient expressed understanding of the issues discussed and wished/consented to proceed.  All questions were answered by Dr. Kim.    Discussion performed between patient and the  provider signing below.     Signature:   Katie Martell PA-C    Date: 6/10/2024 Time: 8:40 AM

## 2024-06-10 ENCOUNTER — OFFICE VISIT (OUTPATIENT)
Dept: OBGYN CLINIC | Facility: CLINIC | Age: 69
End: 2024-06-10
Payer: COMMERCIAL

## 2024-06-10 ENCOUNTER — OFFICE VISIT (OUTPATIENT)
Dept: SURGERY | Facility: CLINIC | Age: 69
End: 2024-06-10
Payer: COMMERCIAL

## 2024-06-10 VITALS
WEIGHT: 176 LBS | SYSTOLIC BLOOD PRESSURE: 122 MMHG | HEIGHT: 70 IN | DIASTOLIC BLOOD PRESSURE: 64 MMHG | BODY MASS INDEX: 25.2 KG/M2

## 2024-06-10 VITALS
BODY MASS INDEX: 25.2 KG/M2 | HEIGHT: 70 IN | SYSTOLIC BLOOD PRESSURE: 122 MMHG | TEMPERATURE: 98.6 F | RESPIRATION RATE: 16 BRPM | OXYGEN SATURATION: 98 % | HEART RATE: 87 BPM | DIASTOLIC BLOOD PRESSURE: 64 MMHG | WEIGHT: 176 LBS

## 2024-06-10 DIAGNOSIS — D51.0 PERNICIOUS ANEMIA: Primary | ICD-10-CM

## 2024-06-10 DIAGNOSIS — L03.012 PARONYCHIA OF LEFT INDEX FINGER: Primary | ICD-10-CM

## 2024-06-10 DIAGNOSIS — K42.9 UMBILICAL HERNIA WITHOUT OBSTRUCTION AND WITHOUT GANGRENE: ICD-10-CM

## 2024-06-10 PROCEDURE — 99243 OFF/OP CNSLTJ NEW/EST LOW 30: CPT | Performed by: PHYSICIAN ASSISTANT

## 2024-06-10 PROCEDURE — 99214 OFFICE O/P EST MOD 30 MIN: CPT | Performed by: PHYSICIAN ASSISTANT

## 2024-06-10 RX ORDER — CEFADROXIL 500 MG/1
500 CAPSULE ORAL EVERY 12 HOURS SCHEDULED
Qty: 14 CAPSULE | Refills: 0 | Status: SHIPPED | OUTPATIENT
Start: 2024-06-10 | End: 2024-06-17

## 2024-06-10 NOTE — PROGRESS NOTES
"Patient Name:  Rayne Gibson  MRN:  [id number]    Assessment & Plan     Left index finger paronychia.  I&D performed in the office today yielding scant purulent drainage.  See procedure note below.  Prescription for 7 days of Duricef.  Recommend warm soapy soaks 3 times daily for the next 1 week.  Continue local wound care.  Work note provided.  Follow-up in 2 weeks with one of our hand specialists.    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  He notes an onset of pain swelling and occasional drainage over the distal portion of the left index finger that began a week or so ago.  He denies any known injury or trauma.  Pain is worse with use and direct pressure.  He does note intermittent cloudy drainage.  He denies any numbness and tingling.  No fevers or chills.    Physical Exam     /64   Ht 5' 10\" (1.778 m)   Wt 79.8 kg (176 lb)   BMI 25.25 kg/m²     Left index finger: Evidence of paronychia on the radial aspect of the index finger with scant cloudy drainage appreciated.  No palpable fluctuance or collection over the distal pulp suggestive of felon.  DIP range of motion is intact without significant pain.  Full composite fist remission without significant pain.  Sensation is intact distally.  Brisk capillary refill.    Eyes: Anicteric sclerae.  ENT: Trachea midline.  Lungs: Normal respiratory effort.  CV: Capillary refill is less than 2 seconds.  Skin: Intact without erythema.  Lymph: No palpable lymphadenopathy.  Neuro: Sensation is grossly intact to light touch.  Psych: Mood and affect are appropriate.    History reviewed. No pertinent past medical history.    History reviewed. No pertinent surgical history.    No Known Allergies    Current Outpatient Medications on File Prior " to Visit   Medication Sig Dispense Refill    Azelastine HCl 137 MCG/SPRAY SOLN       rosuvastatin (CRESTOR) 20 MG tablet Take 1 tablet (20 mg total) by mouth daily 30 tablet 3    cephalexin (KEFLEX) 500 mg capsule Take 500 mg by mouth every 8 (eight) hours (Patient not taking: Reported on 2/8/2024)      Cholecalciferol (Vitamin D3) 1.25 MG (20838 UT) CAPS 1 CAPSULY BY MOUTH EVERY 14 DAYS (Patient not taking: Reported on 6/3/2024)      clobetasol (TEMOVATE) 0.05 % cream Apply to affected areas below the neck once a day as needed. (Patient not taking: Reported on 6/3/2024) 30 g 0    clobetasol propionate (CLOBEX) 0.05 % lotion Apply 1 Application topically 2 (two) times a day for 7 days (Patient not taking: Reported on 2/26/2024) 59 mL 0    folic acid (FOLVITE) 1 mg tablet Take 1 mg by mouth daily (Patient not taking: Reported on 6/3/2024)      meloxicam (Mobic) 15 mg tablet Take 1 tablet (15 mg total) by mouth daily (Patient not taking: Reported on 6/3/2024) 30 tablet 0    methocarbamol (ROBAXIN) 500 mg tablet Take 1 tablet (500 mg total) by mouth 3 (three) times a day as needed for muscle spasms for up to 5 days (Patient not taking: Reported on 2/26/2024) 15 tablet 0    methylPREDNISolone 4 MG tablet therapy pack Use as directed on package (Patient not taking: Reported on 6/3/2024) 21 tablet 0    ondansetron (ZOFRAN) 4 mg tablet Take 1 tablet (4 mg total) by mouth every 8 (eight) hours as needed for nausea or vomiting (Patient not taking: Reported on 2/8/2024) 12 tablet 0     No current facility-administered medications on file prior to visit.       Social History     Tobacco Use    Smoking status: Never     Passive exposure: Yes    Smokeless tobacco: Current     Types: Chew    Tobacco comments:     passive smoke exposure   Vaping Use    Vaping status: Never Used   Substance Use Topics    Alcohol use: No    Drug use: No       Family History   Problem Relation Age of Onset    Lung cancer Father     Nephrolithiasis  Brother     No Known Problems Mother        Review of Systems     As stated in the HPI. All other systems reviewed and are negative.      Incision and drain    Date/Time: 6/10/2024 1:00 PM    Performed by: Gonzales Brunner PA-C  Authorized by: Gonzales Brunner PA-C    Location:     Indications for incision and drainage: Paronychia.    Location: Left index finger.  Pre-procedure details:     Skin preparation:  Antiseptic wash  Anesthesia (see MAR for exact dosages):     Anesthesia method:  Local infiltration    Local anesthetic:  Lidocaine 1% w/o epi  Procedure details:     Incision types:  Stab incision    Scalpel blade:  11    Drainage:  Bloody, serosanguinous and purulent    Drainage amount:  Scant    Wound treatment:  Wound left open  Post-procedure details:     Patient tolerance of procedure:  Tolerated well, no immediate complications

## 2024-06-10 NOTE — TELEPHONE ENCOUNTER
PA for rosuvastatin 20mg    Submitted via    [x]CMM-KEY [id number]  [][employer]-Case ID #   []Faxed to plan   []Other website   []Phone call Case ID #     Office notes sent, clinical questions answered. Awaiting determination    Turnaround time for your insurance to make a decision on your Prior Authorization can take 7-21 business days.

## 2024-06-10 NOTE — LETTER
Andria 10, 2024     Patient: Rayne Gibson  YOB: 1955  Date of Visit: 6/10/2024      To Whom it May Concern:    Rayne Gibson is under my professional care. Rayne was seen in my office on 6/10/2024. Rayne is out of work until 6/12/24.    If you have any questions or concerns, please don't hesitate to call.         Sincerely,          Gonzales Brunner PA-C

## 2024-06-19 ENCOUNTER — TELEPHONE (OUTPATIENT)
Age: 69
End: 2024-06-19

## 2024-06-23 DIAGNOSIS — M17.11 PRIMARY OSTEOARTHRITIS OF RIGHT KNEE: ICD-10-CM

## 2024-06-24 RX ORDER — MELOXICAM 15 MG/1
15 TABLET ORAL DAILY
Qty: 30 TABLET | Refills: 5 | Status: SHIPPED | OUTPATIENT
Start: 2024-06-24

## 2024-06-25 ENCOUNTER — PREP FOR PROCEDURE (OUTPATIENT)
Dept: SURGERY | Facility: CLINIC | Age: 69
End: 2024-06-25

## 2024-06-25 DIAGNOSIS — Z01.818 ENCOUNTER FOR PREADMISSION TESTING: ICD-10-CM

## 2024-06-25 DIAGNOSIS — K42.9 UMBILICAL HERNIA: Primary | ICD-10-CM

## 2024-07-08 ENCOUNTER — OFFICE VISIT (OUTPATIENT)
Dept: OBGYN CLINIC | Facility: MEDICAL CENTER | Age: 69
End: 2024-07-08
Payer: COMMERCIAL

## 2024-07-08 VITALS
WEIGHT: 177 LBS | HEART RATE: 58 BPM | BODY MASS INDEX: 25.34 KG/M2 | SYSTOLIC BLOOD PRESSURE: 124 MMHG | DIASTOLIC BLOOD PRESSURE: 69 MMHG | HEIGHT: 70 IN

## 2024-07-08 DIAGNOSIS — M17.11 PRIMARY OSTEOARTHRITIS OF RIGHT KNEE: Primary | ICD-10-CM

## 2024-07-08 PROCEDURE — 99213 OFFICE O/P EST LOW 20 MIN: CPT | Performed by: ORTHOPAEDIC SURGERY

## 2024-07-08 NOTE — LETTER
July 8, 2024     Patient: Rayne Gibson  YOB: 1955  Date of Visit: 7/8/2024      To Whom it May Concern:    Rayne Gibson is under my professional care. Rayne was seen in my office on 7/8/2024. Rayne can return to work, please allow him to sit while working for another 6 weeks. We will re-evaluate him in 6 weeks    If you have any questions or concerns, please don't hesitate to call.         Sincerely,          Tanner Goetz MD        CC: No Recipients

## 2024-07-08 NOTE — PROGRESS NOTES
"Orthopaedic Surgery - Office Note  Rayne Gibson (69 y.o. male)   : 1955   MRN: [id number]  Encounter Date: 2024    Assessment / Plan  Right knee OA    Continue with HEP   Activity as tolerated  Continue with knee sleeve prn   Medial questionnaire given to the team today   Work note given stating he should sit while working for the next 6 weeks, we will re-evaluate in 6 weeks   Follow-up:  Return in about 6 weeks (around 2024) for follow up with Dr. Goetz.      Chief Complaint / Date of Onset  Right knee pain, no injury, since beginning of May   Injury Mechanism / Date  None  Surgery / Date  None    History of Present Illness   Rayne Gibson is a 69 y.o. male who presents for follow up right knee OA. During his previous visit he had a CSI, he notes a few days later his knee flared up and he was unable to walk. He was seen by Gonzales Brunner PA-C where he underwent as aspiration and was given a medrol dosepak. He states this helped relieved his symptoms.  His lab work came back normal. He was given a note to stay out of work.     Ipad was used for translation during the visit (Meyr dialectic) Anni ([id number])     Treatment Summary  Medications / Modalities  Aleve prn with mild relief  Voltaren gel prn   Bracing / Immobilization  Knee sleeve prn   Physical Therapy  None  Injections  CSI of right knee on 2024, he had increased pain and trouble walking   Prior Surgeries  None  Other Treatments  None     Employment / Current Status  Works at [employer], currently working doing seated work, note was provided by Gonzales Brunner PA-C and given again today      Sport / Organization / Current Status  active    Review of Systems  Pertinent items are noted in HPI.  All other systems were reviewed and are negative.      Physical Exam  /69   Pulse 58   Ht 5' 10\" (1.778 m)   Wt 80.3 kg (177 lb)   BMI 25.40 kg/m²   Cons: Appears well.  No apparent distress.  Psych: Alert. Oriented " x3.  Mood and affect normal.  Eyes: PERRLA, EOMI  Resp: Normal effort.  No audible wheezing or stridor.  CV: Palpable pulse.  No discernable arrhythmia.  No LE edema.  Lymph:  No palpable cervical, axillary, or inguinal lymphadenopathy.  Skin: Warm.  No palpable masses.  No visible lesions.  Neuro: Normal muscle tone.  Normal and symmetric DTR's.     Right Knee Exam  Alignment:  Normal knee alignment.  Inspection:  No swelling. No ecchymosis.  Palpation:   moderate medial and lateral joint line tenderness. No effusion.  ROM:  Knee Extension 5. Knee Flexion 135.  Strength:  Able to actively extend knee against gravity.  Stability:  No objective knee instability. Stable Varus / Valgus stress, Lachman, and Posterior drawer.  Tests:  No pertinent positive or negative tests.  Patella:  Normal patellar mobility.  Neurovascular:  Sensation intact in DP/SP/Velasquez/Sa/T nerve distributions.  2+ DP & PT pulses.  Gait:  Normal.       Studies Reviewed  I have personally reviewed pertinent films in PACS.  XR of right knee - images from 05/24/2024  mild arthritic changes   XR of left knee- images from 05/24/2024 osteochondroma seen on proximal fibula      Procedures  No procedures today.    Medical, Surgical, Family, and Social History  The patient's medical history, family history, and social history, were reviewed and updated as appropriate.    History reviewed. No pertinent past medical history.    History reviewed. No pertinent surgical history.    Family History   Problem Relation Age of Onset    Lung cancer Father     Nephrolithiasis Brother     No Known Problems Mother        Social History     Occupational History    Not on file   Tobacco Use    Smoking status: Never     Passive exposure: Yes    Smokeless tobacco: Current     Types: Chew    Tobacco comments:     passive smoke exposure   Vaping Use    Vaping status: Never Used   Substance and Sexual Activity    Alcohol use: No    Drug use: No    Sexual activity: Not on file        No Known Allergies      Current Outpatient Medications:     Azelastine HCl 137 MCG/SPRAY SOLN, , Disp: , Rfl:     meloxicam (MOBIC) 15 mg tablet, Take 1 tablet by mouth once daily, Disp: 30 tablet, Rfl: 5    rosuvastatin (CRESTOR) 20 MG tablet, Take 1 tablet (20 mg total) by mouth daily, Disp: 30 tablet, Rfl: 3    cephalexin (KEFLEX) 500 mg capsule, Take 500 mg by mouth every 8 (eight) hours (Patient not taking: Reported on 2/8/2024), Disp: , Rfl:     Cholecalciferol (Vitamin D3) 1.25 MG (39953 UT) CAPS, 1 CAPSULY BY MOUTH EVERY 14 DAYS (Patient not taking: Reported on 6/3/2024), Disp: , Rfl:     clobetasol (TEMOVATE) 0.05 % cream, Apply to affected areas below the neck once a day as needed. (Patient not taking: Reported on 6/3/2024), Disp: 30 g, Rfl: 0    clobetasol propionate (CLOBEX) 0.05 % lotion, Apply 1 Application topically 2 (two) times a day for 7 days (Patient not taking: Reported on 2/26/2024), Disp: 59 mL, Rfl: 0    folic acid (FOLVITE) 1 mg tablet, Take 1 mg by mouth daily (Patient not taking: Reported on 6/3/2024), Disp: , Rfl:     methocarbamol (ROBAXIN) 500 mg tablet, Take 1 tablet (500 mg total) by mouth 3 (three) times a day as needed for muscle spasms for up to 5 days (Patient not taking: Reported on 2/26/2024), Disp: 15 tablet, Rfl: 0    methylPREDNISolone 4 MG tablet therapy pack, Use as directed on package (Patient not taking: Reported on 6/3/2024), Disp: 21 tablet, Rfl: 0    ondansetron (ZOFRAN) 4 mg tablet, Take 1 tablet (4 mg total) by mouth every 8 (eight) hours as needed for nausea or vomiting (Patient not taking: Reported on 2/8/2024), Disp: 12 tablet, Rfl: 0      Salud Mueller    Scribe Attestation      I,:  Salud Mueller am acting as a scribe while in the presence of the attending physician.:       I,:  Tanner Goetz MD personally performed the services described in this documentation    as scribed in my presence.:

## 2024-07-29 ENCOUNTER — APPOINTMENT (OUTPATIENT)
Dept: LAB | Age: 69
End: 2024-07-29
Payer: COMMERCIAL

## 2024-07-29 DIAGNOSIS — L30.9 DERMATITIS: ICD-10-CM

## 2024-07-29 DIAGNOSIS — E53.8 B12 DEFICIENCY: ICD-10-CM

## 2024-07-29 DIAGNOSIS — R73.9 HYPERGLYCEMIA: Primary | ICD-10-CM

## 2024-07-29 LAB
ALBUMIN SERPL BCG-MCNC: 4.3 G/DL (ref 3.5–5)
ALP SERPL-CCNC: 40 U/L (ref 34–104)
ALT SERPL W P-5'-P-CCNC: 19 U/L (ref 7–52)
ANION GAP SERPL CALCULATED.3IONS-SCNC: 8 MMOL/L (ref 4–13)
AST SERPL W P-5'-P-CCNC: 18 U/L (ref 13–39)
BILIRUB SERPL-MCNC: 0.55 MG/DL (ref 0.2–1)
BUN SERPL-MCNC: 8 MG/DL (ref 5–25)
CALCIUM SERPL-MCNC: 9.3 MG/DL (ref 8.4–10.2)
CHLORIDE SERPL-SCNC: 106 MMOL/L (ref 96–108)
CHOLEST SERPL-MCNC: 147 MG/DL
CO2 SERPL-SCNC: 27 MMOL/L (ref 21–32)
CREAT SERPL-MCNC: 0.84 MG/DL (ref 0.6–1.3)
GFR SERPL CREATININE-BSD FRML MDRD: 90 ML/MIN/1.73SQ M
GLUCOSE P FAST SERPL-MCNC: 106 MG/DL (ref 65–99)
HDLC SERPL-MCNC: 53 MG/DL
LDLC SERPL CALC-MCNC: 78 MG/DL (ref 0–100)
NONHDLC SERPL-MCNC: 94 MG/DL
POTASSIUM SERPL-SCNC: 4.3 MMOL/L (ref 3.5–5.3)
PROT SERPL-MCNC: 6.9 G/DL (ref 6.4–8.4)
PSA SERPL-MCNC: 2.08 NG/ML (ref 0–4)
SODIUM SERPL-SCNC: 141 MMOL/L (ref 135–147)
TRIGL SERPL-MCNC: 82 MG/DL
TSH SERPL DL<=0.05 MIU/L-ACNC: 1.8 UIU/ML (ref 0.45–4.5)
VIT B12 SERPL-MCNC: 147 PG/ML (ref 180–914)

## 2024-07-29 PROCEDURE — 82180 ASSAY OF ASCORBIC ACID: CPT

## 2024-07-29 PROCEDURE — 82607 VITAMIN B-12: CPT

## 2024-08-02 LAB — VIT C SERPL-MCNC: 0.7 MG/DL (ref 0.4–2)

## 2024-08-03 ENCOUNTER — TELEPHONE (OUTPATIENT)
Dept: OTHER | Facility: OTHER | Age: 69
End: 2024-08-03

## 2024-08-03 NOTE — TELEPHONE ENCOUNTER
Pt is calling to see if surgery can be scheduled for a week sooner; family will be in town the 10/16.  Please leave a message if patient does not answer.

## 2024-10-03 NOTE — H&P (VIEW-ONLY)
Assessment/Plan:   Rayne Gibson is a 69 y.o.male who is here for   No chief complaint on file.    H/o pernicious anemia - stable      Plan: Robotic repair of Umbilical Hernia.     Need to need additional surgery to help imbricate the umbilical skin. We can try this at the same time. May need separate incision to accomplish this.       Post Op Pain Management:     Norco, Motrin, and Tylenol    Preoperative Clearance: None      - Patient has been instructed to avoid herbs or non-directed vitamins the week prior to surgery to ensure no drug interactions with perioperative surgical and anesthetic medications.  - Patient should continue beta-blocker medication up through and including the day of surgery but hold any other hypertensive medications, including diuretics, unless instructed by PCP or anesthesia  - Patient should continue his statin medication up through and including the day of surgery.  - Hold metformin , If on this medication, the morning of surgery and do not resume until 48 hours AFTER surgery to avoid risk of lactic acidosis. Do not resume if eGFR is < 30  - Insulin Management:If on Insulin, patient advised to call PCP for explicit instructions. In general, will need to take one-half normal dose am of surgery but pt advised to consult PCP before making any changes.   - Patient has been instructed to avoid aspirin containing medications or non-steroidal anti-inflammatory drugs for SEVEN days preceding surgery.    Imaging: CT 2019:  There is a small to moderate size fat-containing umbilical hernia     In preparation for this visit all relevant and known prior office notes, prior consultations, emergency room visits, blood work results, and imaging studies were personally reviewed.  A total of  30 minutes was spent reviewing all of this information,caring for this patient, providing differential diagnosis, instructions for management, counseling and coordination of care.  This also includes planning  surgical intervention where indicated.    Patient understands hernia occurrence or re-occurrence risk is higher in a diabetic, tobacco user, with elevated BMIs.   _____________________________________________      HPI:  Rayne Gibson is a 69 y.o.male who was referred for evaluation of No chief complaint on file.  .    Currently complaining of  persistent Umbilical Hernia worse with bending, coughing, lifting, standing, walking, no nausea and no vomiting, regular bowel movement. Duration of pain or symptoms:  over 2 years and no pain       Prior abdominal surgery:   None    BMI: There is no height or weight on file to calculate BMI.     Tobacco use:   Tobacco Use: High Risk (7/8/2024)    Patient History     Smoking Tobacco Use: Never     Smokeless Tobacco Use: Current     Passive Exposure: Yes        Lab Results   Component Value Date    WBC 4.10 (L) 03/25/2021    HGB 12.7 03/25/2021    HCT 39.0 03/25/2021    MCV 92 03/25/2021     03/25/2021     Lab Results   Component Value Date    K 4.3 07/29/2024     07/29/2024    CO2 27 07/29/2024    BUN 8 07/29/2024    CREATININE 0.84 07/29/2024    GLUCOSE 123 04/05/2019    GLUF 106 (H) 07/29/2024    CALCIUM 9.3 07/29/2024    AST 18 07/29/2024    ALT 19 07/29/2024    ALKPHOS 40 07/29/2024    EGFR 90 07/29/2024     Lab Results   Component Value Date    INR 0.96 04/05/2019    PROTIME 12.9 04/05/2019       Smoking Status: Non-smoker     ROS:  General ROS: negative  negative for - chills, fatigue, fever or night sweats, weight loss  Respiratory ROS: no cough, shortness of breath, or wheezing  Cardiovascular ROS: no chest pain or dyspnea on exertion  Genito-Urinary ROS: no dysuria, trouble voiding, or hematuria  Musculoskeletal ROS: negative for - gait disturbance, joint pain or muscle pain  Neurological ROS: no TIA or stroke symptoms  Abdominal ROS: see HPI  GI ROS: see HPI  Skin ROS: no new rashes or lesions   Lymphatic ROS: no new adenopathy noted by pt.   GYN  ROS: see HPI, no new GYN history or bleeding noted  Psy ROS: no new mental or behavioral disturbances       Patient Active Problem List   Diagnosis    DDD (degenerative disc disease), cervical    Chronic sinusitis    Gastroesophageal reflux disease    Idiopathic osteoarthritis    Vitiligo    Polymyositis-dermatomyositis (HCC)    Pernicious anemia    Primary osteoarthritis of right knee         Allergies: Patient has no known allergies.    Meds:  Current Outpatient Medications:     Azelastine HCl 137 MCG/SPRAY SOLN, , Disp: , Rfl:     cephalexin (KEFLEX) 500 mg capsule, Take 500 mg by mouth every 8 (eight) hours (Patient not taking: Reported on 2/8/2024), Disp: , Rfl:     Cholecalciferol (Vitamin D3) 1.25 MG (66125 UT) CAPS, 1 CAPSULY BY MOUTH EVERY 14 DAYS (Patient not taking: Reported on 6/3/2024), Disp: , Rfl:     clobetasol (TEMOVATE) 0.05 % cream, Apply to affected areas below the neck once a day as needed. (Patient not taking: Reported on 6/3/2024), Disp: 30 g, Rfl: 0    clobetasol propionate (CLOBEX) 0.05 % lotion, Apply 1 Application topically 2 (two) times a day for 7 days (Patient not taking: Reported on 2/26/2024), Disp: 59 mL, Rfl: 0    folic acid (FOLVITE) 1 mg tablet, Take 1 mg by mouth daily (Patient not taking: Reported on 6/3/2024), Disp: , Rfl:     meloxicam (MOBIC) 15 mg tablet, Take 1 tablet by mouth once daily, Disp: 30 tablet, Rfl: 5    methocarbamol (ROBAXIN) 500 mg tablet, Take 1 tablet (500 mg total) by mouth 3 (three) times a day as needed for muscle spasms for up to 5 days (Patient not taking: Reported on 2/26/2024), Disp: 15 tablet, Rfl: 0    methylPREDNISolone 4 MG tablet therapy pack, Use as directed on package (Patient not taking: Reported on 6/3/2024), Disp: 21 tablet, Rfl: 0    ondansetron (ZOFRAN) 4 mg tablet, Take 1 tablet (4 mg total) by mouth every 8 (eight) hours as needed for nausea or vomiting (Patient not taking: Reported on 2/8/2024), Disp: 12 tablet, Rfl: 0    rosuvastatin  (CRESTOR) 20 MG tablet, Take 1 tablet (20 mg total) by mouth daily, Disp: 30 tablet, Rfl: 3    PMH:No past medical history on file.    PSH:No past surgical history on file.    Family History   Problem Relation Age of Onset    Lung cancer Father     Nephrolithiasis Brother     No Known Problems Mother         reports that he has never smoked. He has been exposed to tobacco smoke. His smokeless tobacco use includes chew. He reports that he does not drink alcohol and does not use drugs.    There were no vitals filed for this visit.      PHYSICAL EXAM  General Appearance:    Alert, cooperative, no distress,    Head:    Normocephalic without obvious abnormality   Eyes:    PERRL, conjunctiva/corneas clear,      Neck:   Supple, no adenopathy, no JVD   Back:     Symmetric, no spinal or CVA tenderness   Lungs:     Clear to auscultation bilaterally, no wheezing or rhonchi   Heart:    Regular rate and rhythm, S1 and S2 normal, no murmur   Abdomen:      abdomen is soft without significant tenderness, masses, organomegaly or guarding     umbilical hernia  The hernia is, easily reducible,   Extremities:   Extremities normal. No clubbing, cyanosis or edema   Psych:   Normal Affect, AOx3.    Neurologic:  Skin:   CNII-XII intact. Strength symmetric, speech intact    Warm, dry, intact, no visible rashes or lesions                   Informed consent for procedure was personally discussed, reviewed, and signed by Dr. Kim. Discussion by Dr. Kim was carried out regarding risks, benefits, and alternatives with the patient. Risks include but are not limited to:  bleeding, infection, and delayed wound healing or an open wound, pulmonary embolus, leaks from bowel or bile ducts or other viscus, transfusions, death.  Discussed in further detail the more common complications and their rates of occurrence.   was used if necessary.  Patient expressed understanding of the issues discussed and wished/consented to proceed.  All  questions were answered by Dr. Kim.    Discussion performed between patient and the provider signing below.     Signature:   Katie Valdes PA-C    Date: 10/3/2024 Time: 12:46 PM

## 2024-10-03 NOTE — PROGRESS NOTES
Assessment/Plan:   Rayne Gibson is a 69 y.o.male who is here for   No chief complaint on file.    H/o pernicious anemia - stable      Plan: Robotic repair of Umbilical Hernia.     Need to need additional surgery to help imbricate the umbilical skin. We can try this at the same time. May need separate incision to accomplish this.       Post Op Pain Management:     Norco, Motrin, and Tylenol    Preoperative Clearance: None      - Patient has been instructed to avoid herbs or non-directed vitamins the week prior to surgery to ensure no drug interactions with perioperative surgical and anesthetic medications.  - Patient should continue beta-blocker medication up through and including the day of surgery but hold any other hypertensive medications, including diuretics, unless instructed by PCP or anesthesia  - Patient should continue his statin medication up through and including the day of surgery.  - Hold metformin , If on this medication, the morning of surgery and do not resume until 48 hours AFTER surgery to avoid risk of lactic acidosis. Do not resume if eGFR is < 30  - Insulin Management:If on Insulin, patient advised to call PCP for explicit instructions. In general, will need to take one-half normal dose am of surgery but pt advised to consult PCP before making any changes.   - Patient has been instructed to avoid aspirin containing medications or non-steroidal anti-inflammatory drugs for SEVEN days preceding surgery.    Imaging: CT 2019:  There is a small to moderate size fat-containing umbilical hernia     In preparation for this visit all relevant and known prior office notes, prior consultations, emergency room visits, blood work results, and imaging studies were personally reviewed.  A total of  30 minutes was spent reviewing all of this information,caring for this patient, providing differential diagnosis, instructions for management, counseling and coordination of care.  This also includes planning  surgical intervention where indicated.    Patient understands hernia occurrence or re-occurrence risk is higher in a diabetic, tobacco user, with elevated BMIs.   _____________________________________________      HPI:  Rayne Gibson is a 69 y.o.male who was referred for evaluation of No chief complaint on file.  .    Currently complaining of  persistent Umbilical Hernia worse with bending, coughing, lifting, standing, walking, no nausea and no vomiting, regular bowel movement. Duration of pain or symptoms:  over 2 years and no pain       Prior abdominal surgery:   None    BMI: There is no height or weight on file to calculate BMI.     Tobacco use:   Tobacco Use: High Risk (7/8/2024)    Patient History     Smoking Tobacco Use: Never     Smokeless Tobacco Use: Current     Passive Exposure: Yes        Lab Results   Component Value Date    WBC 4.10 (L) 03/25/2021    HGB 12.7 03/25/2021    HCT 39.0 03/25/2021    MCV 92 03/25/2021     03/25/2021     Lab Results   Component Value Date    K 4.3 07/29/2024     07/29/2024    CO2 27 07/29/2024    BUN 8 07/29/2024    CREATININE 0.84 07/29/2024    GLUCOSE 123 04/05/2019    GLUF 106 (H) 07/29/2024    CALCIUM 9.3 07/29/2024    AST 18 07/29/2024    ALT 19 07/29/2024    ALKPHOS 40 07/29/2024    EGFR 90 07/29/2024     Lab Results   Component Value Date    INR 0.96 04/05/2019    PROTIME 12.9 04/05/2019       Smoking Status: Non-smoker     ROS:  General ROS: negative  negative for - chills, fatigue, fever or night sweats, weight loss  Respiratory ROS: no cough, shortness of breath, or wheezing  Cardiovascular ROS: no chest pain or dyspnea on exertion  Genito-Urinary ROS: no dysuria, trouble voiding, or hematuria  Musculoskeletal ROS: negative for - gait disturbance, joint pain or muscle pain  Neurological ROS: no TIA or stroke symptoms  Abdominal ROS: see HPI  GI ROS: see HPI  Skin ROS: no new rashes or lesions   Lymphatic ROS: no new adenopathy noted by pt.   GYN  ROS: see HPI, no new GYN history or bleeding noted  Psy ROS: no new mental or behavioral disturbances       Patient Active Problem List   Diagnosis    DDD (degenerative disc disease), cervical    Chronic sinusitis    Gastroesophageal reflux disease    Idiopathic osteoarthritis    Vitiligo    Polymyositis-dermatomyositis (HCC)    Pernicious anemia    Primary osteoarthritis of right knee         Allergies: Patient has no known allergies.    Meds:  Current Outpatient Medications:     Azelastine HCl 137 MCG/SPRAY SOLN, , Disp: , Rfl:     cephalexin (KEFLEX) 500 mg capsule, Take 500 mg by mouth every 8 (eight) hours (Patient not taking: Reported on 2/8/2024), Disp: , Rfl:     Cholecalciferol (Vitamin D3) 1.25 MG (76307 UT) CAPS, 1 CAPSULY BY MOUTH EVERY 14 DAYS (Patient not taking: Reported on 6/3/2024), Disp: , Rfl:     clobetasol (TEMOVATE) 0.05 % cream, Apply to affected areas below the neck once a day as needed. (Patient not taking: Reported on 6/3/2024), Disp: 30 g, Rfl: 0    clobetasol propionate (CLOBEX) 0.05 % lotion, Apply 1 Application topically 2 (two) times a day for 7 days (Patient not taking: Reported on 2/26/2024), Disp: 59 mL, Rfl: 0    folic acid (FOLVITE) 1 mg tablet, Take 1 mg by mouth daily (Patient not taking: Reported on 6/3/2024), Disp: , Rfl:     meloxicam (MOBIC) 15 mg tablet, Take 1 tablet by mouth once daily, Disp: 30 tablet, Rfl: 5    methocarbamol (ROBAXIN) 500 mg tablet, Take 1 tablet (500 mg total) by mouth 3 (three) times a day as needed for muscle spasms for up to 5 days (Patient not taking: Reported on 2/26/2024), Disp: 15 tablet, Rfl: 0    methylPREDNISolone 4 MG tablet therapy pack, Use as directed on package (Patient not taking: Reported on 6/3/2024), Disp: 21 tablet, Rfl: 0    ondansetron (ZOFRAN) 4 mg tablet, Take 1 tablet (4 mg total) by mouth every 8 (eight) hours as needed for nausea or vomiting (Patient not taking: Reported on 2/8/2024), Disp: 12 tablet, Rfl: 0    rosuvastatin  (CRESTOR) 20 MG tablet, Take 1 tablet (20 mg total) by mouth daily, Disp: 30 tablet, Rfl: 3    PMH:No past medical history on file.    PSH:No past surgical history on file.    Family History   Problem Relation Age of Onset    Lung cancer Father     Nephrolithiasis Brother     No Known Problems Mother         reports that he has never smoked. He has been exposed to tobacco smoke. His smokeless tobacco use includes chew. He reports that he does not drink alcohol and does not use drugs.    There were no vitals filed for this visit.      PHYSICAL EXAM  General Appearance:    Alert, cooperative, no distress,    Head:    Normocephalic without obvious abnormality   Eyes:    PERRL, conjunctiva/corneas clear,      Neck:   Supple, no adenopathy, no JVD   Back:     Symmetric, no spinal or CVA tenderness   Lungs:     Clear to auscultation bilaterally, no wheezing or rhonchi   Heart:    Regular rate and rhythm, S1 and S2 normal, no murmur   Abdomen:      abdomen is soft without significant tenderness, masses, organomegaly or guarding     umbilical hernia  The hernia is, easily reducible,   Extremities:   Extremities normal. No clubbing, cyanosis or edema   Psych:   Normal Affect, AOx3.    Neurologic:  Skin:   CNII-XII intact. Strength symmetric, speech intact    Warm, dry, intact, no visible rashes or lesions                   Informed consent for procedure was personally discussed, reviewed, and signed by Dr. Kim. Discussion by Dr. Kim was carried out regarding risks, benefits, and alternatives with the patient. Risks include but are not limited to:  bleeding, infection, and delayed wound healing or an open wound, pulmonary embolus, leaks from bowel or bile ducts or other viscus, transfusions, death.  Discussed in further detail the more common complications and their rates of occurrence.   was used if necessary.  Patient expressed understanding of the issues discussed and wished/consented to proceed.  All  questions were answered by Dr. Kim.    Discussion performed between patient and the provider signing below.     Signature:   Katie Valdes PA-C    Date: 10/3/2024 Time: 12:46 PM

## 2024-10-07 ENCOUNTER — OFFICE VISIT (OUTPATIENT)
Dept: SURGERY | Facility: CLINIC | Age: 69
End: 2024-10-07
Payer: COMMERCIAL

## 2024-10-07 ENCOUNTER — DOCUMENTATION (OUTPATIENT)
Dept: OTOLARYNGOLOGY | Facility: CLINIC | Age: 69
End: 2024-10-07

## 2024-10-07 VITALS
DIASTOLIC BLOOD PRESSURE: 68 MMHG | TEMPERATURE: 97.6 F | OXYGEN SATURATION: 94 % | SYSTOLIC BLOOD PRESSURE: 124 MMHG | HEART RATE: 68 BPM | RESPIRATION RATE: 16 BRPM | BODY MASS INDEX: 25.2 KG/M2 | WEIGHT: 176 LBS | HEIGHT: 70 IN

## 2024-10-07 DIAGNOSIS — K42.9 UMBILICAL HERNIA WITHOUT OBSTRUCTION AND WITHOUT GANGRENE: Primary | ICD-10-CM

## 2024-10-07 PROCEDURE — 99213 OFFICE O/P EST LOW 20 MIN: CPT | Performed by: SURGERY

## 2024-10-14 ENCOUNTER — APPOINTMENT (OUTPATIENT)
Dept: LAB | Age: 69
End: 2024-10-14
Payer: COMMERCIAL

## 2024-10-14 DIAGNOSIS — Z01.818 ENCOUNTER FOR PREADMISSION TESTING: ICD-10-CM

## 2024-10-14 DIAGNOSIS — R73.9 HYPERGLYCEMIA: ICD-10-CM

## 2024-10-14 LAB
ALBUMIN SERPL BCG-MCNC: 4.2 G/DL (ref 3.5–5)
ALP SERPL-CCNC: 43 U/L (ref 34–104)
ALT SERPL W P-5'-P-CCNC: 17 U/L (ref 7–52)
ANION GAP SERPL CALCULATED.3IONS-SCNC: 8 MMOL/L (ref 4–13)
AST SERPL W P-5'-P-CCNC: 18 U/L (ref 13–39)
BASOPHILS # BLD AUTO: 0.05 THOUSANDS/ΜL (ref 0–0.1)
BASOPHILS NFR BLD AUTO: 1 % (ref 0–1)
BILIRUB SERPL-MCNC: 0.52 MG/DL (ref 0.2–1)
BUN SERPL-MCNC: 7 MG/DL (ref 5–25)
CALCIUM SERPL-MCNC: 9 MG/DL (ref 8.4–10.2)
CHLORIDE SERPL-SCNC: 105 MMOL/L (ref 96–108)
CO2 SERPL-SCNC: 27 MMOL/L (ref 21–32)
CREAT SERPL-MCNC: 0.85 MG/DL (ref 0.6–1.3)
EOSINOPHIL # BLD AUTO: 0.3 THOUSAND/ΜL (ref 0–0.61)
EOSINOPHIL NFR BLD AUTO: 6 % (ref 0–6)
ERYTHROCYTE [DISTWIDTH] IN BLOOD BY AUTOMATED COUNT: 12 % (ref 11.6–15.1)
EST. AVERAGE GLUCOSE BLD GHB EST-MCNC: 134 MG/DL
GFR SERPL CREATININE-BSD FRML MDRD: 90 ML/MIN/1.73SQ M
GLUCOSE P FAST SERPL-MCNC: 100 MG/DL (ref 65–99)
HBA1C MFR BLD: 6.3 %
HCT VFR BLD AUTO: 42.6 % (ref 36.5–49.3)
HGB BLD-MCNC: 13.5 G/DL (ref 12–17)
IMM GRANULOCYTES # BLD AUTO: 0.01 THOUSAND/UL (ref 0–0.2)
IMM GRANULOCYTES NFR BLD AUTO: 0 % (ref 0–2)
LYMPHOCYTES # BLD AUTO: 1.23 THOUSANDS/ΜL (ref 0.6–4.47)
LYMPHOCYTES NFR BLD AUTO: 26 % (ref 14–44)
MCH RBC QN AUTO: 29.8 PG (ref 26.8–34.3)
MCHC RBC AUTO-ENTMCNC: 31.7 G/DL (ref 31.4–37.4)
MCV RBC AUTO: 94 FL (ref 82–98)
MONOCYTES # BLD AUTO: 0.57 THOUSAND/ΜL (ref 0.17–1.22)
MONOCYTES NFR BLD AUTO: 12 % (ref 4–12)
NEUTROPHILS # BLD AUTO: 2.51 THOUSANDS/ΜL (ref 1.85–7.62)
NEUTS SEG NFR BLD AUTO: 55 % (ref 43–75)
NRBC BLD AUTO-RTO: 0 /100 WBCS
PLATELET # BLD AUTO: 328 THOUSANDS/UL (ref 149–390)
PMV BLD AUTO: 10.6 FL (ref 8.9–12.7)
POTASSIUM SERPL-SCNC: 4.5 MMOL/L (ref 3.5–5.3)
PROT SERPL-MCNC: 6.8 G/DL (ref 6.4–8.4)
RBC # BLD AUTO: 4.53 MILLION/UL (ref 3.88–5.62)
SODIUM SERPL-SCNC: 140 MMOL/L (ref 135–147)
WBC # BLD AUTO: 4.67 THOUSAND/UL (ref 4.31–10.16)

## 2024-10-14 PROCEDURE — 85025 COMPLETE CBC W/AUTO DIFF WBC: CPT

## 2024-10-14 PROCEDURE — 36415 COLL VENOUS BLD VENIPUNCTURE: CPT

## 2024-10-14 PROCEDURE — 80053 COMPREHEN METABOLIC PANEL: CPT

## 2024-10-15 ENCOUNTER — APPOINTMENT (OUTPATIENT)
Dept: LAB | Facility: HOSPITAL | Age: 69
End: 2024-10-15
Payer: COMMERCIAL

## 2024-10-15 ENCOUNTER — TELEPHONE (OUTPATIENT)
Dept: SURGERY | Facility: CLINIC | Age: 69
End: 2024-10-15

## 2024-10-15 DIAGNOSIS — Z01.818 ENCOUNTER FOR PREADMISSION TESTING: ICD-10-CM

## 2024-10-15 LAB
ATRIAL RATE: 69 BPM
P AXIS: 28 DEGREES
PR INTERVAL: 162 MS
QRS AXIS: -21 DEGREES
QRSD INTERVAL: 74 MS
QT INTERVAL: 354 MS
QTC INTERVAL: 379 MS
T WAVE AXIS: 57 DEGREES
VENTRICULAR RATE: 69 BPM

## 2024-10-15 PROCEDURE — 93010 ELECTROCARDIOGRAM REPORT: CPT | Performed by: STUDENT IN AN ORGANIZED HEALTH CARE EDUCATION/TRAINING PROGRAM

## 2024-10-15 NOTE — PRE-PROCEDURE INSTRUCTIONS
Pre-Surgery Instructions:   Medication Instructions    Cholecalciferol (Vitamin D3) 1.25 MG (38160 UT) CAPS Stop taking 7 days prior to surgery.    rosuvastatin (CRESTOR) 20 MG tablet Take night before surgery      Medication instructions for day surgery reviewed. Please use only a sip of water to take your instructed medications. Avoid all over the counter vitamins, supplements and NSAIDS for one week prior to surgery per anesthesia guidelines. Tylenol is ok to take as needed.     You will receive a call one business day prior to surgery with an arrival time and hospital directions. If your surgery is scheduled on a Monday, the hospital will be calling you on the Friday prior to your surgery. If you have not heard from anyone by 8pm, please call the hospital supervisor through the hospital  at [phone]. ([place] [phone] or [place] [phone]).    Do not eat or drink anything after midnight the night before your surgery, including candy, mints, lifesavers, or chewing gum. Do not drink alcohol 24hrs before your surgery. Try not to smoke at least 24hrs before your surgery.       Follow the pre surgery showering instructions as listed in the “My Surgical Experience Booklet” or otherwise provided by your surgeon's office. Do not use a blade to shave the surgical area 1 week before surgery. It is okay to use a clean electric clippers up to 24 hours before surgery. Do not apply any lotions, creams, including makeup, cologne, deodorant, or perfumes after showering on the day of your surgery. Do not use dry shampoo, hair spray, hair gel, or any type of hair products.     No contact lenses, eye make-up, or artificial eyelashes. Remove nail polish, including gel polish, and any artificial, gel, or acrylic nails if possible. Remove all jewelry including rings and body piercing jewelry.     Wear causal clothing that is easy to take on and off. Consider your type of surgery.    Keep any valuables, jewelry,  piercings at home. Please bring any specially ordered equipment (sling, braces) if indicated.    Arrange for a responsible person to drive you to and from the hospital on the day of your surgery. Please confirm the visitor policy for the day of your procedure when you receive your phone call with an arrival time.     Call the surgeon's office with any new illnesses, exposures, or additional questions prior to surgery.    Please reference your “My Surgical Experience Booklet” for additional information to prepare for your upcoming surgery.

## 2024-10-21 ENCOUNTER — ANESTHESIA EVENT (OUTPATIENT)
Dept: PERIOP | Facility: HOSPITAL | Age: 69
End: 2024-10-21
Payer: COMMERCIAL

## 2024-10-22 DIAGNOSIS — E78.5 HYPERLIPIDEMIA, UNSPECIFIED HYPERLIPIDEMIA TYPE: ICD-10-CM

## 2024-10-23 ENCOUNTER — HOSPITAL ENCOUNTER (OUTPATIENT)
Facility: HOSPITAL | Age: 69
Setting detail: OUTPATIENT SURGERY
Discharge: HOME | End: 2024-10-23
Attending: SURGERY | Admitting: SURGERY
Payer: COMMERCIAL

## 2024-10-23 ENCOUNTER — APPOINTMENT (OUTPATIENT)
Dept: RADIOLOGY | Facility: HOSPITAL | Age: 69
End: 2024-10-23
Payer: COMMERCIAL

## 2024-10-23 ENCOUNTER — ANESTHESIA (OUTPATIENT)
Dept: PERIOP | Facility: HOSPITAL | Age: 69
End: 2024-10-23
Payer: COMMERCIAL

## 2024-10-23 VITALS
SYSTOLIC BLOOD PRESSURE: 142 MMHG | DIASTOLIC BLOOD PRESSURE: 73 MMHG | WEIGHT: 175.49 LBS | RESPIRATION RATE: 16 BRPM | OXYGEN SATURATION: 97 % | HEART RATE: 63 BPM | BODY MASS INDEX: 25.18 KG/M2 | TEMPERATURE: 97.8 F

## 2024-10-23 DIAGNOSIS — Z98.890 HX OF UMBILICAL HERNIA REPAIR: Primary | ICD-10-CM

## 2024-10-23 DIAGNOSIS — Z87.19 HX OF UMBILICAL HERNIA REPAIR: Primary | ICD-10-CM

## 2024-10-23 PROBLEM — K42.9 UMBILICAL HERNIA: Status: ACTIVE | Noted: 2024-10-23

## 2024-10-23 PROCEDURE — S2900 ROBOTIC SURGICAL SYSTEM: HCPCS | Performed by: SURGERY

## 2024-10-23 PROCEDURE — 74018 RADEX ABDOMEN 1 VIEW: CPT

## 2024-10-23 PROCEDURE — 49592 RPR AA HRN 1ST < 3 NCR/STRN: CPT | Performed by: SURGERY

## 2024-10-23 PROCEDURE — 49592 RPR AA HRN 1ST < 3 NCR/STRN: CPT | Performed by: PHYSICIAN ASSISTANT

## 2024-10-23 PROCEDURE — C1781 MESH (IMPLANTABLE): HCPCS | Performed by: SURGERY

## 2024-10-23 DEVICE — VENTRALEX ST HERNIA PATCH, 6.4 CM (2.5"), CIRCLE
Type: IMPLANTABLE DEVICE | Site: UMBILICAL | Status: FUNCTIONAL
Brand: VENTRALEX

## 2024-10-23 RX ORDER — HYDROMORPHONE HCL/PF 1 MG/ML
SYRINGE (ML) INJECTION AS NEEDED
Status: DISCONTINUED | OUTPATIENT
Start: 2024-10-23 | End: 2024-10-23

## 2024-10-23 RX ORDER — OXYCODONE HYDROCHLORIDE 5 MG/1
5 TABLET ORAL EVERY 4 HOURS PRN
Status: DISCONTINUED | OUTPATIENT
Start: 2024-10-23 | End: 2024-10-23 | Stop reason: HOSPADM

## 2024-10-23 RX ORDER — DEXAMETHASONE SODIUM PHOSPHATE 10 MG/ML
INJECTION, SOLUTION INTRAMUSCULAR; INTRAVENOUS AS NEEDED
Status: DISCONTINUED | OUTPATIENT
Start: 2024-10-23 | End: 2024-10-23

## 2024-10-23 RX ORDER — FENTANYL CITRATE 50 UG/ML
INJECTION, SOLUTION INTRAMUSCULAR; INTRAVENOUS AS NEEDED
Status: DISCONTINUED | OUTPATIENT
Start: 2024-10-23 | End: 2024-10-23

## 2024-10-23 RX ORDER — ROCURONIUM BROMIDE 10 MG/ML
INJECTION, SOLUTION INTRAVENOUS AS NEEDED
Status: DISCONTINUED | OUTPATIENT
Start: 2024-10-23 | End: 2024-10-23

## 2024-10-23 RX ORDER — MEPERIDINE HYDROCHLORIDE 25 MG/ML
12.5 INJECTION INTRAMUSCULAR; INTRAVENOUS; SUBCUTANEOUS
Status: DISCONTINUED | OUTPATIENT
Start: 2024-10-23 | End: 2024-10-23 | Stop reason: HOSPADM

## 2024-10-23 RX ORDER — HYDROCODONE BITARTRATE AND ACETAMINOPHEN 5; 325 MG/1; MG/1
1 TABLET ORAL EVERY 4 HOURS PRN
Qty: 8 TABLET | Refills: 0 | Status: SHIPPED | OUTPATIENT
Start: 2024-10-23 | End: 2024-11-02

## 2024-10-23 RX ORDER — HYDROCODONE BITARTRATE AND ACETAMINOPHEN 5; 325 MG/1; MG/1
1 TABLET ORAL EVERY 4 HOURS PRN
Status: DISCONTINUED | OUTPATIENT
Start: 2024-10-23 | End: 2024-10-23

## 2024-10-23 RX ORDER — MAGNESIUM HYDROXIDE 1200 MG/15ML
LIQUID ORAL AS NEEDED
Status: DISCONTINUED | OUTPATIENT
Start: 2024-10-23 | End: 2024-10-23 | Stop reason: HOSPADM

## 2024-10-23 RX ORDER — ACETAMINOPHEN 10 MG/ML
1000 INJECTION, SOLUTION INTRAVENOUS ONCE
Status: COMPLETED | OUTPATIENT
Start: 2024-10-23 | End: 2024-10-23

## 2024-10-23 RX ORDER — FENTANYL CITRATE/PF 50 MCG/ML
25 SYRINGE (ML) INJECTION
Status: DISCONTINUED | OUTPATIENT
Start: 2024-10-23 | End: 2024-10-23 | Stop reason: HOSPADM

## 2024-10-23 RX ORDER — SODIUM CHLORIDE, SODIUM LACTATE, POTASSIUM CHLORIDE, CALCIUM CHLORIDE 600; 310; 30; 20 MG/100ML; MG/100ML; MG/100ML; MG/100ML
125 INJECTION, SOLUTION INTRAVENOUS CONTINUOUS
Status: DISCONTINUED | OUTPATIENT
Start: 2024-10-23 | End: 2024-10-23 | Stop reason: HOSPADM

## 2024-10-23 RX ORDER — LIDOCAINE HYDROCHLORIDE 10 MG/ML
INJECTION, SOLUTION EPIDURAL; INFILTRATION; INTRACAUDAL; PERINEURAL AS NEEDED
Status: DISCONTINUED | OUTPATIENT
Start: 2024-10-23 | End: 2024-10-23

## 2024-10-23 RX ORDER — MIDAZOLAM HYDROCHLORIDE 2 MG/2ML
INJECTION, SOLUTION INTRAMUSCULAR; INTRAVENOUS AS NEEDED
Status: DISCONTINUED | OUTPATIENT
Start: 2024-10-23 | End: 2024-10-23

## 2024-10-23 RX ORDER — ONDANSETRON 2 MG/ML
INJECTION INTRAMUSCULAR; INTRAVENOUS AS NEEDED
Status: DISCONTINUED | OUTPATIENT
Start: 2024-10-23 | End: 2024-10-23

## 2024-10-23 RX ORDER — ONDANSETRON 2 MG/ML
4 INJECTION INTRAMUSCULAR; INTRAVENOUS ONCE AS NEEDED
Status: DISCONTINUED | OUTPATIENT
Start: 2024-10-23 | End: 2024-10-23 | Stop reason: HOSPADM

## 2024-10-23 RX ORDER — ROSUVASTATIN CALCIUM 20 MG/1
20 TABLET, COATED ORAL DAILY
Qty: 30 TABLET | Refills: 5 | Status: SHIPPED | OUTPATIENT
Start: 2024-10-23

## 2024-10-23 RX ORDER — PROPOFOL 10 MG/ML
INJECTION, EMULSION INTRAVENOUS AS NEEDED
Status: DISCONTINUED | OUTPATIENT
Start: 2024-10-23 | End: 2024-10-23

## 2024-10-23 RX ORDER — CEFAZOLIN SODIUM 1 G/50ML
1000 SOLUTION INTRAVENOUS ONCE
Status: COMPLETED | OUTPATIENT
Start: 2024-10-23 | End: 2024-10-23

## 2024-10-23 RX ORDER — HYDROMORPHONE HCL/PF 1 MG/ML
0.5 SYRINGE (ML) INJECTION
Status: DISCONTINUED | OUTPATIENT
Start: 2024-10-23 | End: 2024-10-23 | Stop reason: HOSPADM

## 2024-10-23 RX ADMIN — ACETAMINOPHEN 1000 MG: 10 INJECTION INTRAVENOUS at 10:18

## 2024-10-23 RX ADMIN — HYDROCODONE BITARTRATE AND ACETAMINOPHEN 1 TABLET: 5; 325 TABLET ORAL at 14:12

## 2024-10-23 RX ADMIN — SUGAMMADEX 200 MG: 100 INJECTION, SOLUTION INTRAVENOUS at 10:24

## 2024-10-23 RX ADMIN — LIDOCAINE HYDROCHLORIDE 100 MG: 10 INJECTION, SOLUTION EPIDURAL; INFILTRATION; INTRACAUDAL; PERINEURAL at 10:09

## 2024-10-23 RX ADMIN — FENTANYL CITRATE 25 MCG: 50 INJECTION INTRAMUSCULAR; INTRAVENOUS at 12:56

## 2024-10-23 RX ADMIN — FENTANYL CITRATE 50 MCG: 50 INJECTION INTRAMUSCULAR; INTRAVENOUS at 11:33

## 2024-10-23 RX ADMIN — ROCURONIUM 10 MG: 50 INJECTION, SOLUTION INTRAVENOUS at 10:40

## 2024-10-23 RX ADMIN — MIDAZOLAM 2 MG: 1 INJECTION INTRAMUSCULAR; INTRAVENOUS at 10:00

## 2024-10-23 RX ADMIN — ROCURONIUM 40 MG: 50 INJECTION, SOLUTION INTRAVENOUS at 10:09

## 2024-10-23 RX ADMIN — FENTANYL CITRATE 25 MCG: 50 INJECTION INTRAMUSCULAR; INTRAVENOUS at 13:05

## 2024-10-23 RX ADMIN — HYDROMORPHONE HYDROCHLORIDE 0.5 MG: 1 INJECTION, SOLUTION INTRAMUSCULAR; INTRAVENOUS; SUBCUTANEOUS at 10:46

## 2024-10-23 RX ADMIN — DEXAMETHASONE SODIUM PHOSPHATE 10 MG: 10 INJECTION INTRAMUSCULAR; INTRAVENOUS at 10:19

## 2024-10-23 RX ADMIN — SODIUM CHLORIDE, SODIUM LACTATE, POTASSIUM CHLORIDE, AND CALCIUM CHLORIDE 125 ML/HR: .6; .31; .03; .02 INJECTION, SOLUTION INTRAVENOUS at 09:00

## 2024-10-23 RX ADMIN — ONDANSETRON 4 MG: 2 INJECTION INTRAMUSCULAR; INTRAVENOUS at 10:19

## 2024-10-23 RX ADMIN — FENTANYL CITRATE 50 MCG: 50 INJECTION INTRAMUSCULAR; INTRAVENOUS at 11:30

## 2024-10-23 RX ADMIN — FENTANYL CITRATE 50 MCG: 50 INJECTION INTRAMUSCULAR; INTRAVENOUS at 10:09

## 2024-10-23 RX ADMIN — CEFAZOLIN SODIUM 1000 MG: 1 SOLUTION INTRAVENOUS at 10:18

## 2024-10-23 RX ADMIN — ROCURONIUM 10 MG: 50 INJECTION, SOLUTION INTRAVENOUS at 11:10

## 2024-10-23 RX ADMIN — PROPOFOL 150 MG: 10 INJECTION, EMULSION INTRAVENOUS at 10:09

## 2024-10-23 RX ADMIN — FENTANYL CITRATE 50 MCG: 50 INJECTION INTRAMUSCULAR; INTRAVENOUS at 10:42

## 2024-10-23 NOTE — ANESTHESIA PREPROCEDURE EVALUATION
Procedure:  HERNIA REPAIR LAP PSB OPEN, PSB MESH, PSB ROBOTIC (Abdomen)    Relevant Problems   GI/HEPATIC   (+) GERD (gastroesophageal reflux disease)      HEMATOLOGY   (+) Pernicious anemia      MUSCULOSKELETAL   (+) DDD (degenerative disc disease), cervical   (+) Idiopathic osteoarthritis   (+) Polymyositis-dermatomyositis (HCC)   (+) Primary osteoarthritis of right knee        Physical Exam    Airway    Mallampati score: II         Dental       Cardiovascular  Rhythm: regular, Rate: normal    Pulmonary   Breath sounds clear to auscultation    Other Findings        Anesthesia Plan  ASA Score- 2     Anesthesia Type- general with ASA Monitors.         Additional Monitors:     Airway Plan:            Plan Factors-Exercise tolerance (METS): >4 METS.    Chart reviewed.   Existing labs reviewed. Patient summary reviewed.    Patient is not a current smoker.      There is medical exclusion for perioperative obstructive sleep apnea risk education.        Induction- intravenous.    Postoperative Plan-     Perioperative Resuscitation Plan - Level 1 - Full Code.       Informed Consent- Anesthetic plan and risks discussed with patient.

## 2024-10-23 NOTE — OP NOTE
REPAIR INCARCERATED VENTRAL HERNIA w MESH  Postoperative Note  PATIENT NAME: Rayne Gibson  : 1955  MRN: [id number]  AL OR ROOM 08    Surgery Date: 10/23/2024    Consent:  The risks, benefits, and alternatives to the surgery were discussed with the patient and with the family prior to surgery, personally by Dr. Kim.  If the consent was obtained by the physician assistant or other representative, the consent was reviewed once again personally by the operating physician.  Common complications particular for this procedure as well as unusual complications were discussed, including but not limited to:  bleeding, wound infection, prolonged wound healing, open wounds, reoperation, leak from the bowel or viscus, leak from the bile duct or injury to adjacent or other organs or blood vessels in the abdomen. If the surgery was laparoscopic, it was discussed that possible open surgery could also occur during that same surgery and is always an option in laparoscopic surgery.  A  was used if necessary.  The patient expressed understanding of the issues discussed and wished and consented to the procedure to proceed.  All questions were answered.  Dr. Kim personally discussed the informed consent with this patient.        Pre operative diagnosis:   Umbilical hernia [K42.9]    Operative Indications:  Symptomatic Ventral Hernia    Operative Findings:  2 cm defect   Hernia final  fascia defect size:  (cm)  2  (Prior to opening fascia, and in aggregate)     Post operative diagnosis:  Post-Op Diagnosis Codes:     * Umbilical hernia [K42.9]    Procedure:  Procedure(s):  REPAIR INCARCERATED VENTRAL HERNIA w MESH    Surgeons and Role:     * May Kim MD - Primary     * Nikole Garvey PA-C - Assisting    The first assistant/PA was medically necessary for surgical safety the case including suturing, retraction, and hemostasis. A qualified resident was not available to first assist.   I provided  direct and immediate supervision.  I was present for the entire procedure.     Drains:  * No LDAs found *    Specimens:  * No specimens in log *    Estimated Blood Loss:   Minimal    Anesthesia Type:   General     Procedure:  The patient was seen in the Holding Room. The risks, benefits, complications, treatment options, and expected outcomes were discussed with the patient. The possibilities of reaction to medication, pulmonary aspiration, perforation of viscus, bleeding, recurrent infection, the need for additional procedures, failure to diagnose a condition, and creating a complication requiring transfusion or operation were discussed with the patient. The patient concurred with the proposed plan, giving informed consent.  The site of surgery properly noted/marked. The patient was taken to Operating Room, identified as Rayne Gibson. Staff confirmed patient name, , and procedure.   A Time Out was held and the above information confirmed.    Operative Indications:  Ventral hernia without obstruction or gangrene [K43.9]  Supraumbilical ventral hernia    Operative Findings:  Partially incarcerated supraumbilical ventral hernia    Complications:   None    Procedure and Technique:      The patient was seen again in the Holding Room. The risks, benefits, complications, treatment options, and expected outcomes were discussed with the patient.  The patient and/or family concurred with the proposed plan, giving informed consent.  Informed consent was personally discussed with the patient and reviewed.    The site of surgery properly noted/marked. The patient was taken to Operating Room, identified as Rayne Gibson  and the procedure verified. A Time Out was held after prepping and draping in sterile fashion.  The above information was confirmed.    Prior to the induction of general anesthesia, antibiotic prophylaxis was administered. General endotracheal anesthesia was then administered and tolerated  well. After the induction, the surgical area was prepped in the usual sterile fashion.     A incision was made at LUQ under  direct vision used to enter the abdomen.  Pneumoperitoneum created and the camera inserted.  There was no intra-abdominal injury or bleeding.  Using appropriate spacing techniques, a 8 mm port was placed in the far lateral mid left abdomen, and an 8 mm port placed in the left lower quadrant. The robot was docked.      The contents of the hernia sac were reduced using scissors and electrocautery and blunt dissection.    An incision was made in the peritoneum approximately 8-10 cm away from the defect.  This was along the peritoneum on the left lateral side from cephalad to caudad, dissecting a preperitoneal plane.  This was then dissected to the right lateral side beyond the hernia sac taking care to make no holes into the peritoneum.    Once the hernia sac was in countered, using blunt and sharp dissection the hernia sac was carefully reduced from the defect.  A large lipoma was also reduced from the preperitoneal space in this defect. The hernia sac was reduced intact.    The defect was closed using a #1  strata Fix PDS suture in a running fashion and overlapping this running suture.       The entire preperitoneal space was freed up for approximately 15 cm caudad to cephalad and and 12 cm medial to lateral.    A Medium Ventralax ST  mesh was used to fit this space to at least 5 cm of overlap were encountered circumferentially.  The mesh was  placed with the rough side against the anterior abdominal wall and the coated side facing the abdomen.     This mesh was sutured in place usin 0 V lock suture    The peritoneum was then reapproximated using 2 0 V lock suture    There were no holes or defects in the peritoneum, upon closure of the peritoneum.      The pneumoperitoneum was released. The robot was undocked.  All ports were removed under direct vision.     All skin incisions were closed  "using 4 Monocryl subcuticular sutures.  The induction of local anesthesia was also given.       A abdominal binder was placed.      The patient tolerated the procedure well in excellent condition.       I was present for the entire procedure    Instrument, sponge, and needle counts were correct prior to closure and at the conclusion of the case.       Some portions of this records may have been generated with voice recognition software. There may be translation, syntax,  or grammatical errors. Occasional wrong word or \"sound-a-like\" substitutions may have occurred due to the inherent limitations of the voice recognition software. Read the chart carefully and recognize, using context, where substations may have occurred. If you have any questions, please contact the dictating provider for clarification or correction, as needed.     Complications: None    Condition: Stable to PACU    SIGNATURE: May Tomas MD   DATE: October 23, 2024   TIME: 11:32 AM   "

## 2024-10-23 NOTE — DISCHARGE INSTR - AVS FIRST PAGE
[facility]  Post-Operative Care Instructions  Dr. May Kim MD, FACS    1. General: You will feel pulling sensations around the wound or funny aches and pains around the incisions. This is normal. Even minor surgery is a change in your body and this is your body’s way of reacting to it. If you have had abdominal surgery, it may help to support the incision with a small pillow or blanket for comfort when moving or coughing.    2. Wound care: Make sure to remove the bandage in about 24 hours, unless instructed otherwise. You usually don't have to redress the wound after 24-48 hours, unless for comfort. Keep the incision clean and dry. Let it air out. If the Steri-Strips fall off, just keep the wound clean.    3. Water: You may shower over the wound, unless there are drain tubes left in place. You may shower right over the staples or Steri-Strips, let soap and water run over the wounds but do not scrub, and pat dry when you are done. Do not bathe or use a pool or hot tub until cleared by the physician.    4. Activity: You may go up and down stairs, walk as much as you are comfortable, but walk at least 3 times each day. If you have had abdominal surgery, do not lift anything heavier than 15 pounds for at least 2-4 weeks, unless cleared by the doctor.    5. Diet: You may resume a regular diet. If you had a same-day surgery or overnight stay surgery, you may wish to eat lightly for a few days: soups, crackers, and sandwiches. You may resume a regular diet when ready.    6. Medications: Resume all of your previous medications, unless told otherwise by the doctor. Avoid aspirin or ibuprofen (Advil, Motrin, etc.) products for 2-3 days after the date of surgery. You may, at that time, began to take them again. Tylenol is always fine, unless you are taking any narcotic pain medication containing Tylenol (such as Percocet, Darvocet, Vicodin, or anything containing acetaminophen). Do not take Tylenol if  you're taking these medications. You do not need to take the narcotic pain medications unless you are having significant pain and discomfort.    7. Driving: You will need someone to drive you home on the day of surgery. Do not drive or make any important decisions while on narcotic pain medication or 24 hours and after anesthesia or sedation for surgery. Generally, you may drive when you're off all narcotic pain medications.    8. Upset Stomach: You may take Maalox, Tums, or similar items for an upset stomach. If your narcotic pain medication causes an upset stomach, do not take it on an empty stomach. Try taking it with at least some crackers or toast.     9. Constipation: Patients often experience constipation after surgery. You may take over-the-counter medication for this, such as Metamucil, Senokot, Dulcolax, milk of magnesia, etc. You may take a suppository unless you have had anorectal surgery such as a procedure on your hemorrhoids. If you experience significant nausea or vomiting after abdominal surgery, call the office before trying any of these medications.    10. Call the office: If you are experiencing any of the following: fevers above 101.5°, significant nausea or vomiting, if the wound develops drainage and/or excessive or spreading redness around the wound, or if you have significant diarrhea or other worsening symptoms.    11. Pain: You may be given a prescription for pain. This will be given to you at the hospital, the day of surgery.    12. Sexual Activity: You may resume sexual activity when you feel ready and comfortable and your incision is sealed and healed without apparent infection risk.    13. Urination: If you haven't urinated in 6 hours, go directly to the ER for evaluation for urinary retention.     [facility]  [address] [place] [address], [address]  [place] [address]  Phone: [phone]

## 2024-10-23 NOTE — TREATMENT PLAN
Note    The prograsp grasper for the robot for sheared a wire while in the operating room and the inside the patient.  The abdomen was immediately examined with the robotic magnifying camera and there were no obvious missing parts and there was no disruption of the instrument other than a sheared wire.  The instrument was immediately withdrawn and replaced with a functional instrument.  There was no critical moment and no harm to the patient.  Sponge, needle and instrument count were correct.    At the end of the case the routine protocol, several flatplate x-rays were taken of the patient.  At one  point . it appeared that there might have been a Ray-Angelica outside of the patient on the drapes but visible on the flatplate so additional flatplate films were taken which revealed no Ray-Angelica or sponge or any foreign body within the patient.  The case was then concluded and the remaining sutures placed.      The patient's son was informed with due diligence and was assured there was no foreign body within the patient as far as we can tell from the x-rays as well as clinical evaluation.    Full disclosure was carried out.    Signature:   May Tomas MD  Date: 10/23/2024 Time: 12:50 PM

## 2024-10-23 NOTE — INTERVAL H&P NOTE
H&P reviewed. After examining the patient I find no changes in the patients condition since the H&P had been written.    Vitals:    10/23/24 0915   BP: 131/66   Pulse: 58   Resp: 16   Temp: 97.5 °F (36.4 °C)   SpO2: 98%       He is aware that he has a fair amount of excess skin and we may do a separate incision to try to implicate the skin or remove it or this may be done in a second operation.he agrees to the plan

## 2024-10-23 NOTE — ANESTHESIA POSTPROCEDURE EVALUATION
Post-Op Assessment Note    CV Status:  Stable    Pain management: adequate       Mental Status:  Alert and awake   Hydration Status:  Euvolemic   PONV Controlled:  Controlled   Airway Patency:  Patent     Post Op Vitals Reviewed: Yes    No anethesia notable event occurred.    Staff: Anesthesiologist, CRNA           Last Filed PACU Vitals:  Vitals Value Taken Time   Temp     Pulse 66 10/23/24 1331   /70 10/23/24 1321   Resp 25 10/23/24 1330   SpO2 96 % 10/23/24 1331   Vitals shown include unfiled device data.    Modified Mary:  Activity: 2 (10/23/2024  1:21 PM)  Respiration: 2 (10/23/2024  1:21 PM)  Circulation: 2 (10/23/2024  1:21 PM)  Consciousness: 2 (10/23/2024  1:21 PM)  Oxygen Saturation: 2 (10/23/2024  1:21 PM)  Modified Mary Score: 10 (10/23/2024  1:21 PM)

## 2024-10-23 NOTE — ANESTHESIA POSTPROCEDURE EVALUATION
Post-Op Assessment Note    CV Status:  Stable    Pain management: adequate       Mental Status:  Alert, awake and sleepy   Hydration Status:  Euvolemic   PONV Controlled:  Controlled   Airway Patency:  Patent     Post Op Vitals Reviewed: Yes    No anethesia notable event occurred.    Staff: CRNA       Last Filed PACU Vitals:  Vitals Value Taken Time   Temp     Pulse     BP     Resp     SpO2         Modified Mary:  No data recorded

## 2024-10-28 ENCOUNTER — TELEPHONE (OUTPATIENT)
Age: 69
End: 2024-10-28

## 2024-10-28 NOTE — TELEPHONE ENCOUNTER
REPAIR INCARCERATED VENTRAL HERNIA w MESH (Abdomen) done on 10/23/24 by Dr.Marion Kim. Patient calling back to confirm that he can remove the dressings except for the steri strips and he can take a shower. This was confirmed for patient.

## 2024-10-28 NOTE — TELEPHONE ENCOUNTER
"PT's son, Francisco Gibson, called in offering to serve as  for PT. (PT speaks Gujarati.) Unable to verify [facility] Medication Communication Consent.     As son was attempting add PT to call, call was disconnected x2.     Called Cl to obtain Virgie , PT then added to call.     PT post-op day 5 Procedure: REPAIR INCARCERATED VENTRAL HERNIA w MESH (Abdomen). PT with x2 complaints.     1) PT c/o continued pain in naval rating 6-7/10. PT has been taking Norco and placing an ice pack on abd with pain relief. Reassurance provided and encouraged PT to continue pain management. Pt verbalized understanding and agreeable to plan.    2) PT also asking about removing bandage and if he can remove the \"plastic\" bandages and if he may shower. Advised PT that he may remove all of the bandages except the steri-strips. Also advised that he may shower but to not scrub incisions. Pt verbalized understanding and agreeable to plan.    No further action needed at this time.             "

## 2024-10-31 NOTE — PROGRESS NOTES
Assessment/Plan:   Rayne Gibson is a 69 y.o.male who comes in today for postoperative check after : robotic ventral hernia repair on 10/23/24 with Dr. Kim.     Medium Ventralax ST  mesh     Pathology: No pathology sent.     Patient is very pleased with the outcome of their surgery.     Postoperative restrictions reviewed, including specific lifting and exercise restrictions. All questions answered.       ______________________________________________________  HPI:  Rayne Gibson is a 69 y.o.male who comes in today for postoperative check after recent robotic ventral hernia repair on 10/23/24 with Dr. Kim.     Currently doing well without problems, no fever or chills,no nausea and no vomiting. No chest pain or trouble breathing.     Reports no issues.    ROS:  General ROS: negative for - chills, fatigue, fever or night sweats, weight loss  Respiratory ROS: no cough, shortness of breath, or wheezing  Cardiovascular ROS: no chest pain or dyspnea on exertion  Genito-Urinary ROS: no dysuria, trouble voiding, or hematuria  Musculoskeletal ROS: negative for - gait disturbance, joint pain or muscle pain  Neurological ROS: no TIA or stroke symptoms  GI ROS: see HPI  Skin ROS: no new rashes or lesions   Lymphatic ROS: no new adenopathy noted by pt.   GYN ROS: see HPI, no new GYN history or bleeding noted  Psy ROS: no new mental or behavioral disturbances       Patient Active Problem List   Diagnosis    DDD (degenerative disc disease), cervical    Chronic sinusitis    GERD (gastroesophageal reflux disease)    Idiopathic osteoarthritis    Vitiligo    Polymyositis-dermatomyositis (HCC)    Pernicious anemia    Primary osteoarthritis of right knee    Umbilical hernia         Patient has no known allergies.      Current Outpatient Medications:     rosuvastatin (CRESTOR) 20 MG tablet, TAKE 1 TABLET BY MOUTH EVERY DAY, Disp: 30 tablet, Rfl: 5    Cholecalciferol (Vitamin D3) 1.25 MG (72277 UT) CAPS, , Disp: ,  Rfl:     Past Medical History:   Diagnosis Date    Arthritis     GERD (gastroesophageal reflux disease)        Past Surgical History:   Procedure Laterality Date    [place] RPR AA HERNIA 1ST < 3 CM REDUCIBLE N/A 10/23/2024    Procedure: REPAIR INCARCERATED VENTRAL HERNIA w MESH;  Surgeon: May Kim MD;  Location: [facility];  Service: General       Family History   Problem Relation Age of Onset    Lung cancer Father     Nephrolithiasis Brother     No Known Problems Mother         reports that he has never smoked. He has been exposed to tobacco smoke. He has quit using smokeless tobacco.  His smokeless tobacco use included chew. He reports that he does not drink alcohol and does not use drugs.    Vitals:    11/07/24 0853   BP: 110/62   Pulse: 67   Resp: 16   Temp: (!) 97.3 °F (36.3 °C)   SpO2: 98%       PHYSICAL EXAM  General: normal, cooperative, no distress  Abdominal: soft, nondistended, or nontender  Incision: clean, dry, and intact and healing well      Katie Valdes PA-C    Date: 11/7/2024 Time: 9:24 AM

## 2024-11-05 ENCOUNTER — TELEPHONE (OUTPATIENT)
Age: 69
End: 2024-11-05

## 2024-11-05 NOTE — TELEPHONE ENCOUNTER
Arti from [facility] called to verify patient had surgery and confirmed patient Post op appointment on 11/07/2024 at 9:00 am

## 2024-11-07 ENCOUNTER — OFFICE VISIT (OUTPATIENT)
Dept: SURGERY | Facility: CLINIC | Age: 69
End: 2024-11-07
Payer: COMMERCIAL

## 2024-11-07 VITALS
HEART RATE: 67 BPM | WEIGHT: 180 LBS | OXYGEN SATURATION: 98 % | RESPIRATION RATE: 16 BRPM | BODY MASS INDEX: 25.77 KG/M2 | SYSTOLIC BLOOD PRESSURE: 110 MMHG | TEMPERATURE: 97.3 F | HEIGHT: 70 IN | DIASTOLIC BLOOD PRESSURE: 62 MMHG

## 2024-11-07 DIAGNOSIS — Z98.890 S/P REPAIR OF VENTRAL HERNIA: Primary | ICD-10-CM

## 2024-11-07 DIAGNOSIS — Z87.19 S/P REPAIR OF VENTRAL HERNIA: Primary | ICD-10-CM

## 2024-11-07 PROCEDURE — 99212 OFFICE O/P EST SF 10 MIN: CPT | Performed by: PHYSICIAN ASSISTANT

## 2024-12-09 ENCOUNTER — OFFICE VISIT (OUTPATIENT)
Age: 69
End: 2024-12-09
Payer: COMMERCIAL

## 2024-12-09 VITALS
HEART RATE: 87 BPM | HEIGHT: 68 IN | DIASTOLIC BLOOD PRESSURE: 80 MMHG | BODY MASS INDEX: 26.83 KG/M2 | WEIGHT: 177 LBS | TEMPERATURE: 96.1 F | SYSTOLIC BLOOD PRESSURE: 150 MMHG | OXYGEN SATURATION: 99 %

## 2024-12-09 DIAGNOSIS — E78.5 HYPERLIPIDEMIA, UNSPECIFIED HYPERLIPIDEMIA TYPE: ICD-10-CM

## 2024-12-09 DIAGNOSIS — U07.1 COVID-19: Primary | ICD-10-CM

## 2024-12-09 PROCEDURE — 99214 OFFICE O/P EST MOD 30 MIN: CPT | Performed by: INTERNAL MEDICINE

## 2024-12-09 RX ORDER — NIRMATRELVIR AND RITONAVIR 300-100 MG
3 KIT ORAL 2 TIMES DAILY
Qty: 30 TABLET | Refills: 0 | Status: SHIPPED | OUTPATIENT
Start: 2024-12-09 | End: 2024-12-14

## 2024-12-09 NOTE — PROGRESS NOTES
COVID-19 Outpatient Progress Note  Name: Rayne Gibson      : 1955      MRN: [id number]  Encounter Provider: Lola Ritter MD  Encounter Date: 2024   Encounter department: [facility]    Assessment & Plan  COVID-19    Orders:    nirmatrelvir & ritonavir (Paxlovid, 300/100,) tablet therapy pack; Take 3 tablets by mouth 2 (two) times a day for 5 days Take 2 nirmatrelvir tablets + 1 ritonavir tablet together per dose    Hyperlipidemia, unspecified hyperlipidemia type         Advised to hold Crestor while he is on medication.  5-day excuse given to the patient.   Disposition:     Patient was advised that they can go back to your normal activities when, for at least 24 hours, both are true: their symptoms are getting better overall and they have not had a fever (and are not using fever-reducing medication).    When going back to your normal activities, take added precaution over the next 5 days, such as taking additional steps for  air, hygiene, masks, physical distancing, and/or testing when you will be around other people indoors. You may still be able to spread the virus that made you sick, even if you are feeling better.    If patient develops a fever or starts to feel worse after they have gone back to normal activities, stay home and away from others again until, for at least 24 hours, both are true: symptoms are improving overall and they have not had a fever (and are not using fever-reducing medication). Then take added precaution for the next 5 days.      Patient meets criteria for Paxlovid and they have been counseled appropriately regarding risks, benefits, side effects, and alternative treatment options. After discussion, patient agrees to treatment.    Possible side effects of Paxlovid?    Possible side effects of Paxlovid are:  - Liver Problems. Notify us right away if you start to experience loss of appetite, yellowing of your skin and the whites of eyes  (jaundice), dark-colored urine, pale colored stools and itchy skin, stomach area (abdominal) pain.  - Resistance to HIV Medicines. If you have untreated HIV infection, Paxlovid may lead to some HIV medicines not working as well in the future.  - Other possible side effects include: altered sense of taste, diarrhea, high blood pressure, or muscle aches.    I have spent a total time of 20 minutes on the day of the encounter for this patient including risks and benefits of treatment options, instructions for management, risk factor reductions and reviewing/ordering tests, medicine, procedures.          Encounter provider: Lola Ritter MD     Provider located at: [facility]  [address]  [address]  [facility] [address] [phone]     Recent Visits  No visits were found meeting these conditions.  Showing recent visits within past 7 days and meeting all other requirements  Today's Visits  Date Type Provider Dept   12/09/24 Office Visit Loal Ritter MD [facility]   Showing today's visits and meeting all other requirements  Future Appointments  No visits were found meeting these conditions.  Showing future appointments within next 150 days and meeting all other requirements    History of Present Illness      Subjective:   TrinaendLuanne Gibson is a 69 y.o. male who is concerned about COVID-19. Patient's symptoms include chills, nasal congestion, rhinorrhea, cough and vomiting. Patient denies fever, fatigue, malaise, sore throat, anosmia, loss of taste, shortness of breath, chest tightness, abdominal pain, nausea, diarrhea, myalgias and headaches.         COVID-19 vaccination status: Fully vaccinated with booster    Lab Results   Component Value Date    SARSCORONAVI Not Detected 05/03/2022       Review of Systems   Constitutional:  Positive for chills. Negative for fatigue and fever.   HENT:  Positive for congestion and rhinorrhea. Negative for sore throat.    Respiratory:   "Positive for cough. Negative for chest tightness and shortness of breath.    Gastrointestinal:  Positive for vomiting. Negative for abdominal pain, diarrhea and nausea.   Musculoskeletal:  Negative for myalgias.   Neurological:  Negative for headaches.     Objective   /80 (BP Location: Left arm, Patient Position: Sitting, Cuff Size: Standard)   Pulse 87   Temp (!) 96.1 °F (35.6 °C) (Temporal)   Ht 5' 8\" (1.727 m)   Wt 80.3 kg (177 lb)   SpO2 99%   BMI 26.91 kg/m²     Physical Exam  Constitutional:       Comments: Sounds congested   Cardiovascular:      Rate and Rhythm: Normal rate and regular rhythm.   Pulmonary:      Effort: Pulmonary effort is normal. No respiratory distress.      Breath sounds: Normal breath sounds. No wheezing or rales.   Neurological:      Mental Status: He is alert.       "

## 2024-12-17 ENCOUNTER — OFFICE VISIT (OUTPATIENT)
Age: 69
End: 2024-12-17
Payer: COMMERCIAL

## 2024-12-17 VITALS
DIASTOLIC BLOOD PRESSURE: 86 MMHG | SYSTOLIC BLOOD PRESSURE: 130 MMHG | TEMPERATURE: 98.2 F | HEIGHT: 70 IN | OXYGEN SATURATION: 98 % | BODY MASS INDEX: 25.91 KG/M2 | HEART RATE: 76 BPM | WEIGHT: 181 LBS

## 2024-12-17 DIAGNOSIS — R05.3 CHRONIC COUGH: ICD-10-CM

## 2024-12-17 DIAGNOSIS — U07.1 COVID-19: Primary | ICD-10-CM

## 2024-12-17 DIAGNOSIS — J31.0 CHRONIC RHINITIS: ICD-10-CM

## 2024-12-17 PROBLEM — M33.90 POLYMYOSITIS-DERMATOMYOSITIS (HCC): Status: RESOLVED | Noted: 2024-06-03 | Resolved: 2024-12-17

## 2024-12-17 PROCEDURE — 99213 OFFICE O/P EST LOW 20 MIN: CPT | Performed by: INTERNAL MEDICINE

## 2024-12-17 RX ORDER — BENZONATATE 200 MG/1
200 CAPSULE ORAL 3 TIMES DAILY PRN
COMMUNITY
Start: 2024-12-04

## 2024-12-17 RX ORDER — LORATADINE 10 MG/1
10 TABLET ORAL DAILY
Qty: 30 TABLET | Refills: 3 | Status: SHIPPED | OUTPATIENT
Start: 2024-12-17

## 2024-12-17 NOTE — PROGRESS NOTES
"Name: Rayne Gibson      : 1955      MRN: [id number]  Encounter Provider: Lola Ritter MD  Encounter Date: 2024   Encounter department: [facility]  :  Assessment & Plan  COVID-19  Clinical improvement       Chronic cough    Orders:    XR chest pa and lateral; Future    Chronic rhinitis    Orders:    loratadine (CLARITIN) 10 mg tablet; Take 1 tablet (10 mg total) by mouth daily           History of Present Illness     HPI  Patient is here to follow-up on recent COVID infection.  He reports remarkable improvement with Paxlovid.  He does mention chronic postnasal drip that is being proceeding even recent COVID infection.  Courage him to take antihistamine over-the-counter.  Does use Leslie pot.  Has been seen by neurologist in the past.  A chest x-ray in the beginning of the year was unremarkable.  We may repeat it again next month.    He had abdominal umbilical hernia surgery which is doing quite well.  Review of Systems    Objective   /86 (BP Location: Left arm, Patient Position: Sitting, Cuff Size: Large)   Pulse 76   Temp 98.2 °F (36.8 °C) (Temporal)   Ht 5' 10\" (1.778 m)   Wt 82.1 kg (181 lb)   SpO2 98%   BMI 25.97 kg/m²      Physical Exam  Cardiovascular:      Heart sounds: Normal heart sounds. No murmur heard.  Pulmonary:      Effort: Pulmonary effort is normal. No respiratory distress.      Breath sounds: Normal breath sounds. No wheezing or rales.   Abdominal:      General: There is no distension.      Tenderness: There is no abdominal tenderness. There is no guarding.         "

## 2024-12-19 ENCOUNTER — TELEPHONE (OUTPATIENT)
Age: 69
End: 2024-12-19

## 2024-12-19 NOTE — TELEPHONE ENCOUNTER
Spoke with patient states he has occasional discomfort but no pain or redness at this time having normal bowel movements. States he wants his abdomen evaluated again to rule out another possible hernia because his abdomen is big.,Advised we will place him on a cancellation list and will reach out to him.Patient was ok with this.

## 2024-12-19 NOTE — TELEPHONE ENCOUNTER
Patient had surgery with Dr. Kim and the area is hardened. His pcp advised he comes in for follow up asap. Soonest available was 1/21. He asked if there is any way he could come sooner. Please advise.

## 2024-12-23 ENCOUNTER — OFFICE VISIT (OUTPATIENT)
Dept: SURGERY | Facility: CLINIC | Age: 69
End: 2024-12-23
Payer: COMMERCIAL

## 2024-12-23 ENCOUNTER — TELEPHONE (OUTPATIENT)
Age: 69
End: 2024-12-23

## 2024-12-23 VITALS
SYSTOLIC BLOOD PRESSURE: 138 MMHG | HEIGHT: 70 IN | DIASTOLIC BLOOD PRESSURE: 62 MMHG | HEART RATE: 72 BPM | TEMPERATURE: 96.9 F | BODY MASS INDEX: 25.77 KG/M2 | WEIGHT: 180 LBS | RESPIRATION RATE: 16 BRPM | OXYGEN SATURATION: 99 %

## 2024-12-23 DIAGNOSIS — Z09 S/P UMBILICAL HERNIA REPAIR, FOLLOW-UP EXAM: Primary | ICD-10-CM

## 2024-12-23 PROCEDURE — 99211 OFF/OP EST MAY X REQ PHY/QHP: CPT | Performed by: PHYSICIAN ASSISTANT

## 2024-12-23 NOTE — PROGRESS NOTES
Assessment/Plan:   Rayne Gibson is a 69 y.o.male who comes in today for postoperative check after : robotic ventral hernia repair on 10/23/24 with Dr. Kim.     Medium Ventralax ST  mesh     Pathology: No pathology sent.     Patient is very pleased with the outcome of their surgery.     On examination, no recurrent hernia, no pain on examination, no lump or bumps. No need for imaging. Patient is quitting his job and moving to [place].      ______________________________________________________  HPI:  Rayne Gibson is a 69 y.o.male who comes in today for postoperative check after recent robotic ventral hernia repair on 10/23/24 with Dr. Kim.     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 Patient states he is leaving his job tomorrow and moving to [place]    Reports no issues.    ROS:  General ROS: negative for - chills, fatigue, fever or night sweats, weight loss  Respiratory ROS: no cough, shortness of breath, or wheezing  Cardiovascular ROS: no chest pain or dyspnea on exertion  Genito-Urinary ROS: no dysuria, trouble voiding, or hematuria  Musculoskeletal ROS: negative for - gait disturbance, joint pain or muscle pain  Neurological ROS: no TIA or stroke symptoms  GI ROS: see HPI  Skin ROS: no new rashes or lesions   Lymphatic ROS: no new adenopathy noted by pt.   GYN ROS: see HPI, no new GYN history or bleeding noted  Psy ROS: no new mental or behavioral disturbances       Patient Active Problem List   Diagnosis    DDD (degenerative disc disease), cervical    Chronic sinusitis    GERD (gastroesophageal reflux disease)    Idiopathic osteoarthritis    Vitiligo    Pernicious anemia    Primary osteoarthritis of right knee    Umbilical hernia         Patient has no known allergies.      Current Outpatient Medications:     loratadine (CLARITIN) 10 mg tablet, Take 1 tablet (10 mg total) by mouth daily,  Disp: 30 tablet, Rfl: 3    rosuvastatin (CRESTOR) 20 MG tablet, TAKE 1 TABLET BY MOUTH EVERY DAY, Disp: 30 tablet, Rfl: 5    benzonatate (TESSALON) 200 MG capsule, Take 200 mg by mouth 3 (three) times a day as needed for cough (Patient not taking: Reported on 12/23/2024), Disp: , Rfl:     Cholecalciferol (Vitamin D3) 1.25 MG (94711 UT) CAPS, , Disp: , Rfl:     Past Medical History:   Diagnosis Date    Arthritis     GERD (gastroesophageal reflux disease)        Past Surgical History:   Procedure Laterality Date    DC RPR AA HERNIA 1ST < 3 CM REDUCIBLE N/A 10/23/2024    Procedure: REPAIR INCARCERATED VENTRAL HERNIA w MESH;  Surgeon: May Kim MD;  Location: [facility];  Service: General       Family History   Problem Relation Age of Onset    Lung cancer Father     Nephrolithiasis Brother     No Known Problems Mother         reports that he has never smoked. He has been exposed to tobacco smoke. He has quit using smokeless tobacco.  His smokeless tobacco use included chew. He reports that he does not drink alcohol and does not use drugs.    Vitals:    12/23/24 1229   BP: 138/62   Pulse: 72   Resp: 16   Temp: (!) 96.9 °F (36.1 °C)   SpO2: 99%         PHYSICAL EXAM  General: normal, cooperative, no distress  Abdominal: soft, nondistended, or nontender  Incision: clean, dry, and intact and healing well      Katie Valdes PA-C    Date: 12/23/2024 Time: 12:32 PM

## (undated) DEVICE — SUT STRATAFIX SPIRAL PDS PLUS 1  CT-1 18 IN SXPP1A404

## (undated) DEVICE — MEGA SUTURECUT ND: Brand: ENDOWRIST

## (undated) DEVICE — SCD SEQUENTIAL COMPRESSION COMFORT SLEEVE MEDIUM KNEE LENGTH: Brand: KENDALL SCD

## (undated) DEVICE — PROGRASP FORCEPS: Brand: ENDOWRIST

## (undated) DEVICE — 4-PORT MANIFOLD: Brand: NEPTUNE 2

## (undated) DEVICE — ALLENTOWN LAP CHOLE APP PACK: Brand: CARDINAL HEALTH

## (undated) DEVICE — ABSORBABLE WOUND CLOSURE DEVICE: Brand: V-LOC 90

## (undated) DEVICE — PMI DISPOSABLE PUNCTURE CLOSURE DEVICE / SUTURE GRASPER: Brand: PMI

## (undated) DEVICE — INTENDED FOR TISSUE SEPARATION, AND OTHER PROCEDURES THAT REQUIRE A SHARP SURGICAL BLADE TO PUNCTURE OR CUT.: Brand: BARD-PARKER SAFETY BLADES SIZE 15, STERILE

## (undated) DEVICE — SUT STRATAFIX SMTR PDS PLUS 1 CT-1 30CM SXPP1A435

## (undated) DEVICE — DRAPE EQUIPMENT RF WAND

## (undated) DEVICE — DRAPE SHEET X-LG

## (undated) DEVICE — CHLORAPREP HI-LITE 26ML ORANGE

## (undated) DEVICE — TELFA NON-ADHERENT ABSORBENT DRESSING: Brand: TELFA

## (undated) DEVICE — ELECTRO LUBE IS A SINGLE PATIENT USE DEVICE THAT IS INTENDED TO BE USED ON ELECTROSURGICAL ELECTRODES TO REDUCE STICKING.: Brand: KEY SURGICAL ELECTRO LUBE

## (undated) DEVICE — COLUMN DRAPE

## (undated) DEVICE — ARM DRAPE

## (undated) DEVICE — DISPOSABLE OR TOWEL: Brand: CARDINAL HEALTH

## (undated) DEVICE — PLUMEPEN PRO 10FT

## (undated) DEVICE — [HIGH FLOW INSUFFLATOR,  DO NOT USE IF PACKAGE IS DAMAGED,  KEEP DRY,  KEEP AWAY FROM SUNLIGHT,  PROTECT FROM HEAT AND RADIOACTIVE SOURCES.]: Brand: PNEUMOSURE

## (undated) DEVICE — 3M™ STERI-STRIP™ COMPOUND BENZOIN TINCTURE 40 BAGS/CARTON 4 CARTONS/CASE C1544: Brand: 3M™ STERI-STRIP™

## (undated) DEVICE — SUT PDS II 1 CT-1 27 IN Z347H

## (undated) DEVICE — SUT MONOCRYL 4-0 PS-2 27 IN Y426H

## (undated) DEVICE — MONOPOLAR CURVED SCISSORS: Brand: ENDOWRIST

## (undated) DEVICE — NEEDLE HYPO 23G X 1-1/2 IN

## (undated) DEVICE — TIP COVER ACCESSORY

## (undated) DEVICE — GLOVE INDICATOR PI UNDERGLOVE SZ 6.5 BLUE

## (undated) DEVICE — ASTOUND STANDARD SURGICAL GOWN, XL: Brand: CONVERTORS

## (undated) DEVICE — SUT STRATAFIX SPIRAL PDS PLUS 0 CT-2 30CM SXPP1B405

## (undated) DEVICE — CANNULA SEAL

## (undated) DEVICE — GLOVE SRG BIOGEL 6.5

## (undated) DEVICE — EXOFIN PRECISION PEN HIGH VISCOSITY TOPICAL SKIN ADHESIVE: Brand: EXOFIN PRECISION PEN, 1G

## (undated) DEVICE — SUT VICRYL 2-0 SH 27 IN UNDYED J417H

## (undated) DEVICE — TUBING SMOKE EVAC W/FILTRATION DEVICE PLUMEPORT ACTIV

## (undated) DEVICE — BLADELESS OBTURATOR: Brand: WECK VISTA

## (undated) DEVICE — 3M™ STERI-STRIP™ REINFORCED ADHESIVE SKIN CLOSURES, R1547, 1/2 IN X 4 IN (12 MM X 100 MM), 6 STRIPS/ENVELOPE: Brand: 3M™ STERI-STRIP™

## (undated) DEVICE — MAYO STAND COVER: Brand: CONVERTORS